# Patient Record
Sex: FEMALE | Race: OTHER | HISPANIC OR LATINO | Employment: FULL TIME | URBAN - METROPOLITAN AREA
[De-identification: names, ages, dates, MRNs, and addresses within clinical notes are randomized per-mention and may not be internally consistent; named-entity substitution may affect disease eponyms.]

---

## 2017-11-16 ENCOUNTER — GENERIC CONVERSION - ENCOUNTER (OUTPATIENT)
Dept: OTHER | Facility: OTHER | Age: 32
End: 2017-11-16

## 2018-01-17 NOTE — RESULT NOTES
Verified Results  (1) TSH WITH FT4 REFLEX 08Oct2016 11:22AM Madeline Owens    Order Number: [id number]     Test Name Result Flag Reference   TSH 0 708 uIU/mL  0 358-3 740   Patients undergoing fluorescein dye angiography may retain small amounts of fluorescein in the body for 48-72 hours post procedure  Samples containing fluorescein can produce falsely depressed TSH values  If the patient had this procedure,a specimen should be resubmitted post fluorescein clearance  The recommended reference ranges for TSH during pregnancy are as follows:  First trimester 0 1 to 2 5 uIU/mL  Second trimester  0 2 to 3 0 uIU/mL  Third trimester 0 3 to 3 0 uIU/m     (1) COMPREHENSIVE METABOLIC PANEL [id number] 06:39XI Radha Leader Order Number: [id number]     Test Name Result Flag Reference   GLUCOSE,RANDM 86 mg/dL     If the patient is fasting, the ADA then defines impaired fasting glucose as > 100 mg/dL and diabetes as > or equal to 123 mg/dL  SODIUM 140 mmol/L  136-145   POTASSIUM 4 0 mmol/L  3 5-5 3   CHLORIDE 108 mmol/L  100-108   CARBON DIOXIDE 26 mmol/L  21-32   ANION GAP (CALC) 6 mmol/L  4-13   BLOOD UREA NITROGEN 12 mg/dL  5-25   CREATININE 0 83 mg/dL  0 60-1 30   Standardized to IDMS reference method   CALCIUM 8 5 mg/dL  8 3-10 1   BILI, TOTAL 0 31 mg/dL  0 20-1 00   ALK PHOSPHATAS 63 U/L     ALT (SGPT) 17 U/L  12-78   AST(SGOT) 11 U/L  5-45   ALBUMIN 4 0 g/dL  3 5-5 0   TOTAL PROTEIN 7 1 g/dL  6 4-8 2   eGFR Non-African American      >60 0 ml/min/1 73sq [facility] Disease Education Program recommendations are as follows:  GFR calculation is accurate only with a steady state creatinine  Chronic Kidney disease less than 60 ml/min/1 73 sq  meters  Kidney failure less than 15 ml/min/1 73 sq  meters       (1) CBC/PLT/DIFF [id number] 11:22AM Radha Leader Order Number: [id number]     Test Name Result Flag Reference   WBC COUNT 6 20 Thousand/uL 4 31-10 16   RBC COUNT 4 29 Million/uL  3 81-5 12   HEMOGLOBIN 12 8 g/dL  11 5-15 4   HEMATOCRIT 39 0 %  34 8-46  1   MCV 91 fL  82-98   MCH 29 8 pg  26 8-34 3   MCHC 32 8 g/dL  31 4-37 4   RDW 13 5 %  11 6-15 1   MPV 9 9 fL  8 9-12 7   PLATELET COUNT 506 Thousands/uL  149-390   nRBC AUTOMATED 0 /100 WBCs     NEUTROPHILS RELATIVE PERCENT 65 %  43-75   LYMPHOCYTES RELATIVE PERCENT 27 %  14-44   MONOCYTES RELATIVE PERCENT 5 %  4-12   EOSINOPHILS RELATIVE PERCENT 2 %  0-6   BASOPHILS RELATIVE PERCENT 1 %  0-1   NEUTROPHILS ABSOLUTE COUNT 4 04 Thousands/?L  1 85-7 62   LYMPHOCYTES ABSOLUTE COUNT 1 68 Thousands/?L  0 60-4 47   MONOCYTES ABSOLUTE COUNT 0 31 Thousand/?L  0 17-1 22   EOSINOPHILS ABSOLUTE COUNT 0 10 Thousand/?L  0 00-0 61   BASOPHILS ABSOLUTE COUNT 0 05 Thousands/?L  0 00-0 10   - Patient Instructions: This bloodwork is non-fasting  Please drink two glasses of water morning of bloodwork  - Patient Instructions: This bloodwork is non-fasting  Please drink two glasses of water morning of bloodwork

## 2018-01-22 VITALS
HEART RATE: 82 BPM | SYSTOLIC BLOOD PRESSURE: 118 MMHG | RESPIRATION RATE: 14 BRPM | DIASTOLIC BLOOD PRESSURE: 74 MMHG | WEIGHT: 157 LBS | TEMPERATURE: 97.8 F | BODY MASS INDEX: 26.8 KG/M2 | HEIGHT: 64 IN

## 2018-09-26 PROBLEM — H11.003 PTERYGIUM OF BOTH EYES: Status: ACTIVE | Noted: 2017-11-16

## 2018-09-26 RX ORDER — PHENOL 1.4 %
1 AEROSOL, SPRAY (ML) MUCOUS MEMBRANE
COMMUNITY
Start: 2016-11-18 | End: 2018-09-27

## 2018-09-26 RX ORDER — ESCITALOPRAM OXALATE 10 MG/1
1 TABLET ORAL DAILY
COMMUNITY
Start: 2016-11-18 | End: 2018-09-27

## 2018-09-26 RX ORDER — PNV NO.95/FERROUS FUM/FOLIC AC 28MG-0.8MG
TABLET ORAL
COMMUNITY
End: 2018-09-27 | Stop reason: SDUPTHER

## 2018-09-27 ENCOUNTER — OFFICE VISIT (OUTPATIENT)
Dept: FAMILY MEDICINE CLINIC | Facility: CLINIC | Age: 33
End: 2018-09-27
Payer: COMMERCIAL

## 2018-09-27 VITALS
RESPIRATION RATE: 14 BRPM | WEIGHT: 176.2 LBS | HEART RATE: 78 BPM | DIASTOLIC BLOOD PRESSURE: 72 MMHG | HEIGHT: 63 IN | TEMPERATURE: 97.6 F | BODY MASS INDEX: 31.22 KG/M2 | SYSTOLIC BLOOD PRESSURE: 116 MMHG

## 2018-09-27 DIAGNOSIS — Z3A.08 8 WEEKS GESTATION OF PREGNANCY: Primary | ICD-10-CM

## 2018-09-27 DIAGNOSIS — Z32.01 POSITIVE PREGNANCY TEST: ICD-10-CM

## 2018-09-27 PROCEDURE — 3008F BODY MASS INDEX DOCD: CPT | Performed by: FAMILY MEDICINE

## 2018-09-27 PROCEDURE — 99213 OFFICE O/P EST LOW 20 MIN: CPT | Performed by: FAMILY MEDICINE

## 2018-09-27 RX ORDER — PNV NO.95/FERROUS FUM/FOLIC AC 28MG-0.8MG
1 TABLET ORAL DAILY
Qty: 90 TABLET | Refills: 3 | Status: SHIPPED | OUTPATIENT
Start: 2018-09-27 | End: 2019-01-23 | Stop reason: SDUPTHER

## 2018-09-27 NOTE — ASSESSMENT & PLAN NOTE
Positive pregnancy test in office and at home  Counseled patient regarding early pregnancy loss  Reviewed precautions for vaginal bleeding and when to call/go to ED   Obtain prenatal labs  Obtain dating U/S  By LMP, this is a [id number] at 8w0d  RTC 2 weeks for initial PN visit

## 2018-09-27 NOTE — PROGRESS NOTES
Anne Barton 1985 female MRN: [id number]    Acute Visit    SUBJECTIVE    CC:  Positive pregnancy test     HPI:  Anne Barton is a 29 y o  female who presented for an acute visit complaining of HPI Pregnancy test positive    Took a home pregnancy test that was positive  They've been trying to conceive for the last 1 year  G1 2005 C/S failed cervical dilation term  G2 2009 C/S term  G3 2016 18 weeks  G4 current pregnancy  May be interested in [facility]  No sugar or BP problems  She does complain of some bleeding 3x  3 days ago x1  Then 1 5 weeks ago x1  Pink discharge, no clots  No cramping  Vaginal discharge normal, no small  No dysuria  Regular BM  Denies nausea, vomiting  Not taking prenatal vitamin  She does not take any daily medications  Last drink on her birthday   Quit smoking when she found out she was pregnant  Review of Systems   Constitutional: Negative for activity change, chills and fever  HENT: Negative for congestion, rhinorrhea and sore throat  Eyes: Negative for visual disturbance  Respiratory: Negative for cough, shortness of breath and wheezing  Cardiovascular: Negative for chest pain and palpitations  Gastrointestinal: Negative for abdominal pain, blood in stool, constipation, diarrhea, nausea and vomiting  Genitourinary: Positive for vaginal bleeding  Negative for dysuria, flank pain, frequency, menstrual problem, pelvic pain, urgency, vaginal discharge and vaginal pain  Musculoskeletal: Negative for arthralgias and myalgias  Skin: Negative for rash  Neurological: Negative for dizziness, weakness and headaches  All other systems reviewed and are negative  Historical Information   The patient history was reviewed as follows:  History reviewed  No pertinent past medical history    Past Surgical History:   Procedure Laterality Date     SECTION       Family History   Problem Relation Age of Onset    Diabetes Father     Diabetes Brother       Social History   History   Alcohol Use    Yes     Comment: Social     History   Drug Use No     History   Smoking Status    Current Every Day Smoker   Smokeless Tobacco    Never Used       Medications:   Meds/Allergies   Current Outpatient Prescriptions   Medication Sig Dispense Refill    Prenatal Vit-Fe Fumarate-FA (PRENATAL VITAMIN) 27-0 8 MG TABS Take 1 tablet by mouth daily 90 tablet 3    prenatal multivitamin (ULTRATABS) TABS Take 1 tablet by mouth daily 30 tablet 0     No current facility-administered medications for this visit  Allergies not on file    OBJECTIVE    Vitals:   Vitals:    09/27/18 1606   BP: 116/72   BP Location: Right arm   Patient Position: Sitting   Cuff Size: Large   Pulse: 78   Resp: 14   Temp: 97 6 °F (36 4 °C)   TempSrc: Tympanic   Weight: 79 9 kg (176 lb 3 2 oz)   Height: 5' 3 2" (1 605 m)       Physical Exam   Constitutional: Vital signs are normal  She appears well-developed and well-nourished  She does not appear ill  No distress  HENT:   Head: Normocephalic and atraumatic  Right Ear: External ear normal    Left Ear: External ear normal    Nose: Nose normal    Eyes: Conjunctivae, EOM and lids are normal    Neck: No JVD present  No tracheal deviation present  Cardiovascular: Intact distal pulses  Pulmonary/Chest: No accessory muscle usage  No respiratory distress  Abdominal: Normal appearance  Neurological: She is alert  Skin: No rash noted  She is not diaphoretic  Psychiatric: She has a normal mood and affect  Her speech is normal and behavior is normal  She expresses no suicidal ideation  Nursing note and vitals reviewed  Lab:  I have personally reviewed all pertinent results  Imaging:  I have personally reviewed all pertinent results  EKG, Pathology, and Other Studies:   I have personally reviewed all pertinent results      Assessment/Plan   8 weeks gestation of pregnancy  Positive pregnancy test in office and at home  Counseled patient regarding early pregnancy loss  Reviewed precautions for vaginal bleeding and when to call/go to ED   Obtain prenatal labs  Obtain dating U/S  By LMP, this is a [id number] at 8w0d  RTC 2 weeks for initial PN visit    Diagnoses and all orders for this visit:    8 weeks gestation of pregnancy  -     prenatal multivitamin (ULTRATABS) TABS; Take 1 tablet by mouth daily  -     Prenatal Panel; Future  -     US OB < 14 weeks with transvaginal; Future  -     Prenatal Vit-Fe Fumarate-FA (PRENATAL VITAMIN) 27-0 8 MG TABS; Take 1 tablet by mouth daily    Positive pregnancy test            - PCP: Eloisa Caro MD  - Follow-up appointments: [facility]    Future Appointments  Date Time Provider Tram Galvezi   10/9/2018 8:45 AM [facility]   10/10/2018 4:20 PM Marisela Ching MD [facility]      _____________________________________________________________________   The attending physician, Dr Pamela Pinedo, agreed with the plan  Jeremy Islas MD, PGY-3  [facility]   9/27/2018        Please be aware that this note contains text that was dictated and there may be errors pertaining to "sound-alike "words during the dictation process

## 2018-09-27 NOTE — PATIENT INSTRUCTIONS
Un embarazo,   CUIDADO AMBULATORIO:   Lo qué necesita saber sobre el Giovanni: Un embarazo normal dura alrededor de 40 semanas  El primer trimestre dura desde estrada último periodo hasta la semana 12 de [place]  El yaquelin trimestre se extiende desde la semana 13 de estrada embarazo hasta la semana 23  El tercer trimestre se extiende desde la semana 24 de embarazo hasta que nazca estrada bebé  Si usted conoce la fecha de estrada último periodo, estrada médico puede calcular la fecha de nacimiento de estrada bebé  Es posible que usted de a vangie a estrada bebé en cualquier momento desde la semana 37 hasta 2 semanas después de la fecha calculada de Meri  Busque atención médica de inmediato si:   · Usted presenta un vickie dolor de tiny que no desaparece  · Usted tiene cambios en la visión nuevos o en aumento, lavinia visión borrosa o con manchas  · Usted tiene inflamación nueva o creciente en estrada mack o syd  · Usted tiene dolor o cólicos en el abdomen o la parte baja de la espalda  · Usted tiene sangrado vaginal   Comuníquese con estrada médico o obstreta si:   · Usted tiene calambres, presión o tensión abdominal     · Usted tiene un cambio en la secreción vaginal     · Usted no puede retener alimentos ni líquidos y está perdiendo [place]  · Usted tiene escalofríos o fiebre  · Usted tiene comezón, ardor o dolor vaginal      · Usted tiene kurt secreción vaginal amarillenta, verdosa, ronn o de [employer]  · Usted tiene dolor o ardor al [facility], orina menos de lo habitual o tiene [place] rosada o sanguinolenta  · Usted tiene preguntas o inquietudes acerca de estrada condición o cuidado  Cambios corporales que pueden ocurrir gustavo estrada embarazo:   · Los cambios en los senos  que usted Kimberly Lithonia sensibilidad y cosquilleo gustavo la primera parte de estrada [place]  Los senos se volverán más grandes  Es posible que necesite un sostén con soporte   Es posible que usted calvin [facility] secreción delgada y LEEANNE, conocida lavinia calostro, que sale de justus pezones gustavo el yaquelin trimestre  El calostro es un líquido que se convertirá en [place] alrededor de 3 días después de usted stanislaw dado a vangie  · Cambios en la piel y estrías  podrían ocurrir gustavo estrada embarazo  Es posible que usted tenga marcas ireland, conocidas lavinia estrías, en estrada piel  Las [employer] se desvanecen después del [facility]  Utilice crema si estrada piel está seca y con comezón  La piel de estrada mack, alrededor de los pezones y debajo de estrada ombligo podría oscurecerse  La mayoría del [place], estrada piel Lee Mallet a estrada color normal después del nacimiento de estrada bebé  · El malestar matutino  consiste en náuseas y vómitos que pueden ocurrir en cualquier momento del día  Evite los alimentos grasosos y picantes  Coma comidas pequeñas gustavo el día en vez de porciones grandes  El jengibre puede ayudar a [employer]  Consulte con estrada médico acerca de otras formas para disminuir las náuseas y el vómito  · Acidez estomacal  puede ser causada por los cambios hormonales gustavo estrada [facility]  El Claudetta Sane en crecimiento puede empujar estrada estómago hacia arriba y forzar ácido estomacal a acumularse dentro de estrada esófago  [place] 4 o 5 comidas pequeñas cada día en vez de comidas grandes  Evite los alimentos picantes  Evite comer lily antes de irse a la cama  · Estreñimiento  puede desarrollarse gustavo estrada embarazo  Para tratar el estreñimiento, coma alimentos altos en fibra lavinia cereales con fibra, frijoles, frutas, verduras, panes integrales y [place]  Gearlean Quiet de Jenna regular y tome suficiente agua  Es posible que estrada médico sugiera un suplemento con fibra para ablandar justus evacuaciones intestinales  Consulte con estrada médico antes de usar cualquier medicamento para disminuir el estreñimiento  · Las hemorroides  son Elan Hun grandes en el área rectal  Pueden causar dolor, comezón y sangrado de color kong vivo en estrada recto   Para disminuir el riesgo de hemorroides, prevenga el estreñimiento y no se esfuerce cuando tenga kurt evacuación intestinal  Si usted tiene hemorroides, sumérjase en kurt bañera con agua tibia para aliviar la incomodidad  Consulte con carl médico cómo puede tratar las hemorroides  · Los calambres y la hinchazón en las piernas  pueden ser causados por niveles bajos de calcio o por el peso adicional del Giovanni  Eleve justus piernas por encima del nivel de carl corazón para disminuir la hinchazón  Gustavo un calambre en la pierna, estire o de un masaje al [employer] que tiene el calambre  El calor puede ayudar a disminuir el dolor y los espasmos musculares  Aplique calor sobre el músculo por 20 a 30 minutos cada 2 horas por la cantidad de días que se le indique  · Dolor en la espalda  puede ocurrir a medida que carl bebé crece  No esté de pie por largos periodos de tiempo ni levante objetos pesados  Use kurt buena postura mientras esté de pie, se agache o se doble  Use zapatos de tacón bajo con un buen soporte  Descansar puede también ayudarla a aliviar el dolor de espalda  Pregunte a carl médico acerca de ejercicios que usted pueda hacer para fortalecer los músculos de carl espalda  Manténgase saludable gustavo carl embarazo:   · Consuma alimentos saludables y variados  Alimentos saludables incluyen frutas, verduras, panes de monet integral, alimentos lácteos bajos en grasa, frijoles, manohar magras y pescado  [place] líquidos lavinia se le haya indicado  Pregunte cuánto líquido debe genna cada día y cuáles líquidos son los más adecuados para usted  Limite el consumo de cafeína a menos de [place] [address]  Limite el consumo de pescado a 2 porciones cada semana  Escoja pescado con concentraciones bajas de marlen lavinia atún ligero enlatado, camarón, cangrejo, salmón, bacalao o tilapia  No  coma pescado con concentraciones altas de marlen lavinia pez ap, paolo abraham, pargo rayado y tiburón  · [address] [address]    Carl necesidad de ciertas vitaminas y [address], lavinia el ácido fólico, aumenta gustavo el [facility]  Las vitaminas prenatales proporcionan algunas de las vitaminas y minerales adicionales que usted necesita  Las vitaminas prenatales también podrían ayudar a disminuir el riesgo de ciertos defectos de nacimiento  · Pregunte cuánto peso usted debe aumentar gustavo estrada embarazo  Demasiado aumento de peso o muy poco puede ser poco saludable para usted y estrada bebé  · Consulte con estrada médico acerca de hacer ejercicio  El ejercicio moderado puede ayudarla a mantenerse en forma  Estrada médico la ayudará a planear un programa de ejercicios que sea seguro para usted gustavo estrada [facility]  · No fume  Si usted fuma, nunca es demasiado tarde para dejar de hacerlo  Fumar aumenta el riesgo de aborto espontáneo y otros problemas de dianelys gustavo estrada [facility]  Fumar puede causar que estrada bebé nazca antes de tiempo o que pese menos al nacer  Solicite información a estrada médico si usted necesita ayuda para dejar de fumar  · No consuma alcohol  El alcohol pasa de estrada cuerpo al bebé a través de la placenta  Puede afectar el desarrollo del cerebro de estrada bebé y provocar el síndrome de alcoholismo fetal (SAF)  FAS es un phi de condiciones que causan [address], de comportamiento y de crecimiento  · Consulte con estrada médico antes de genna cualquier medicamento  Muchos medicamentos pueden perjudicar a estrada bebé si usted los siabel [address] [place] [address] Avenue  No tome ningún medicamento, vitaminas, hierbas o suplementos sin sana consultar con estrada [facility] Shank  use drogas ilegales o de la michel (lavinia marihuana o cocaína) mientras está embarazada  Consejos de seguridad:   · Evite jacuzzis y saunas  No use un jacuzzi o un sauna mientras usted está embarazada, especialmente gustavo el primer trimestre  Los [facility] y los saunas aumentan la temperatura de estrada bebé y el riesgo de defectos de nacimiento  · Evite la toxoplasmosis    Wells es [facility] infección causada por comer carne cruda o estar cerca del excremento de un liss infectado  [place] puede causar malformaciones congénitas, aborto espontáneo y Huseyin Schein  Lávese las syd después de tocar carne cruda  Asegúrese de que la carne esté alek cocida antes de comerla  Evite los huevos crudos y la Gearldean Maeve  Use guantes o pida que alguien la ayude a limpiar la caja de arena del liss mientras usted Laqueta Snipe  · Consulte con carl médico acerca de viajar  El [address] cómodo para viajar es gustavo el yaquelin trimestre  Pregunte a carl médico si usted puede viajar después de las 36 semanas  Es posible que no pueda viajar en avión después de las 36 [address]  También le puede recomendar que evite largos viajes por carretera  Programe un control con carl médico u obstetra según lo indicado:  Vaya a todas justus citas prenatales gustavo carl embarazo  Anote justus preguntas para que se acuerde de hacerlas gustavo justus visitas  © 2017 [address] Information is for End User's use only and may not be sold, redistributed or otherwise used for commercial purposes  All illustrations and images included in CareNotes® are the copyrighted property of A D A M , Inc  or Fritz Rogel  Esta información es sólo para uso en educación  Carl intención no es darle un consejo médico sobre enfermedades o tratamientos  Colsulte con carl Melven Rimes farmacéutico antes de seguir cualquier régimen médico para saber si es seguro y efectivo para usted

## 2018-10-07 ENCOUNTER — LAB (OUTPATIENT)
Dept: LAB | Facility: HOSPITAL | Age: 33
End: 2018-10-07
Payer: COMMERCIAL

## 2018-10-07 ENCOUNTER — HOSPITAL ENCOUNTER (OUTPATIENT)
Dept: RADIOLOGY | Facility: HOSPITAL | Age: 33
Discharge: HOME | End: 2018-10-07
Payer: COMMERCIAL

## 2018-10-07 DIAGNOSIS — Z3A.08 8 WEEKS GESTATION OF PREGNANCY: ICD-10-CM

## 2018-10-07 LAB
ABO GROUP BLD: NORMAL
BASOPHILS # BLD AUTO: 0.04 THOUSANDS/ΜL (ref 0–0.1)
BASOPHILS NFR BLD AUTO: 0 % (ref 0–1)
BILIRUB UR QL STRIP: NEGATIVE
BLD GP AB SCN SERPL QL: NEGATIVE
CLARITY UR: CLEAR
COLOR UR: NORMAL
EOSINOPHIL # BLD AUTO: 0.05 THOUSAND/ΜL (ref 0–0.61)
EOSINOPHIL NFR BLD AUTO: 1 % (ref 0–6)
ERYTHROCYTE [DISTWIDTH] IN BLOOD BY AUTOMATED COUNT: 12.1 % (ref 11.6–15.1)
GLUCOSE UR STRIP-MCNC: NEGATIVE MG/DL
HBV SURFACE AG SER QL: NORMAL
HCT VFR BLD AUTO: 37.8 % (ref 34.8–46.1)
HGB BLD-MCNC: 12.5 G/DL (ref 11.5–15.4)
HGB UR QL STRIP.AUTO: NEGATIVE
IMM GRANULOCYTES # BLD AUTO: 0.03 THOUSAND/UL (ref 0–0.2)
IMM GRANULOCYTES NFR BLD AUTO: 0 % (ref 0–2)
KETONES UR STRIP-MCNC: NEGATIVE MG/DL
LEUKOCYTE ESTERASE UR QL STRIP: NEGATIVE
LYMPHOCYTES # BLD AUTO: 1.71 THOUSANDS/ΜL (ref 0.6–4.47)
LYMPHOCYTES NFR BLD AUTO: 18 % (ref 14–44)
MCH RBC QN AUTO: 30.7 PG (ref 26.8–34.3)
MCHC RBC AUTO-ENTMCNC: 33.1 G/DL (ref 31.4–37.4)
MCV RBC AUTO: 93 FL (ref 82–98)
MONOCYTES # BLD AUTO: 0.44 THOUSAND/ΜL (ref 0.17–1.22)
MONOCYTES NFR BLD AUTO: 5 % (ref 4–12)
NEUTROPHILS # BLD AUTO: 7.4 THOUSANDS/ΜL (ref 1.85–7.62)
NEUTS SEG NFR BLD AUTO: 76 % (ref 43–75)
NITRITE UR QL STRIP: NEGATIVE
NRBC BLD AUTO-RTO: 0 /100 WBCS
PH UR STRIP.AUTO: 5.5 [PH] (ref 4.5–8)
PLATELET # BLD AUTO: 264 THOUSANDS/UL (ref 149–390)
PMV BLD AUTO: 10.4 FL (ref 8.9–12.7)
PROT UR STRIP-MCNC: NEGATIVE MG/DL
RBC # BLD AUTO: 4.07 MILLION/UL (ref 3.81–5.12)
RH BLD: POSITIVE
RUBV IGG SERPL IA-ACNC: 69.4 IU/ML
SP GR UR STRIP.AUTO: 1.02 (ref 1–1.03)
SPECIMEN EXPIRATION DATE: NORMAL
UROBILINOGEN UR QL STRIP.AUTO: 0.2 E.U./DL
WBC # BLD AUTO: 9.67 THOUSAND/UL (ref 4.31–10.16)

## 2018-10-07 PROCEDURE — 36415 COLL VENOUS BLD VENIPUNCTURE: CPT

## 2018-10-07 PROCEDURE — 80081 OBSTETRIC PANEL INC HIV TSTG: CPT

## 2018-10-07 PROCEDURE — 87086 URINE CULTURE/COLONY COUNT: CPT

## 2018-10-07 PROCEDURE — 76801 OB US < 14 WKS SINGLE FETUS: CPT

## 2018-10-07 PROCEDURE — 81003 URINALYSIS AUTO W/O SCOPE: CPT

## 2018-10-08 LAB
BACTERIA UR CULT: NORMAL
HIV 1+2 AB+HIV1 P24 AG SERPL QL IA: NORMAL
RPR SER QL: NORMAL

## 2018-10-10 ENCOUNTER — ROUTINE PRENATAL (OUTPATIENT)
Dept: FAMILY MEDICINE CLINIC | Facility: CLINIC | Age: 33
End: 2018-10-10
Payer: COMMERCIAL

## 2018-10-10 VITALS — WEIGHT: 179.6 LBS | BODY MASS INDEX: 31.61 KG/M2 | SYSTOLIC BLOOD PRESSURE: 122 MMHG | DIASTOLIC BLOOD PRESSURE: 80 MMHG

## 2018-10-10 DIAGNOSIS — R30.0 DYSURIA: Primary | ICD-10-CM

## 2018-10-10 LAB
SL AMB  POCT GLUCOSE, UA: NEGATIVE
SL AMB LEUKOCYTE ESTERASE,UA: NEGATIVE
SL AMB POCT BILIRUBIN,UA: NEGATIVE
SL AMB POCT BLOOD,UA: NEGATIVE
SL AMB POCT CLARITY,UA: CLEAR
SL AMB POCT COLOR,UA: YELLOW
SL AMB POCT KETONES,UA: NEGATIVE
SL AMB POCT NITRITE,UA: NEGATIVE
SL AMB POCT PH,UA: 5
SL AMB POCT SPECIFIC GRAVITY,UA: 1
SL AMB POCT URINE PROTEIN: NEGATIVE
SL AMB POCT UROBILINOGEN: 0.2

## 2018-10-10 PROCEDURE — 81003 URINALYSIS AUTO W/O SCOPE: CPT | Performed by: FAMILY MEDICINE

## 2018-10-10 PROCEDURE — 0501F PRENATAL FLOW SHEET: CPT | Performed by: FAMILY MEDICINE

## 2018-10-10 NOTE — PROGRESS NOTES
OB/GYN  PN Visit  Gisele Ledesma  [id number]  10/10/2018  8:43 PM  Dr Lita Cortes MD    S: 29 y o  [id number]  here for initial PN visit  HPI: 29 y o  [id number]  here for  Initial PN visit  G1: 2/2005: term C/S 2/2 to failed cervical dilation   G2: 7/2009: term C/S says she was told by doctor she had to have a c section   G3: 1/2016: loss @ 18wks  G4: Current pregnancy, pt may be interested in [facility], says she's scared of labor and delivering vaginally  DARLENE: 05/09/19  LMP: 08/02/18    OB Cx: none, previously smoker quit about 1 5 months ago (after she found out she was pregnant)  OB complaints: none  Ctxns: denies having uterine contractions and denies fever  Leakage:  no LOF  Bleeding: no VB  Fetal movement: too early to detect    She reports increased frequency, denies urgency or dysuria  O:  Vitals:    10/10/18 1634   BP: 122/80       Gen:  No acute distress, nonlabored breathing  HENT: EOM nl  Cardiovascular: Regular, Rate and Rhythm, no murmur, gallop or rub   Respiratory: Clear to Auscultation Bilaterally, no wheezing, rhonchi or rales    Abdominal: Soft  NT/ND  Gravid  Neuro: AAOX3  Psychiatric: Normal mood and affect  Speech and behavior normal   OB exam completed: fundal height, FHTs, ultrasound if indicated    A/P:  #1  [id number] w/o complaints, doing well  Prenatal Panel Reviewed:    - HIV: non-reactive   - Hep B surface antigen: wnl   - Rubella: Immune   - RPR: non-reactive   - Type & Screen: O+   - Antibody Screen: (-)   - CBC: wnl    US 10/07/18:    - single intrauterine gestation @ 9w1d (+/-4)        RTC in 1 week for Pap, 1 month for PN F/U    #2  Last Pap 2015: pt is due for Pap, however would like to schedule an appointment at a later time      #3  Genetic Screening Questionaire non-contributory (maternal & paternal)    Unable to detect FHT most probably due to early dates, will continue to monitor    Counseled pt regarding early pregnancy loss  Early Labor precautions reviewed  Discussed the importance of a well balanced diet  Discussed the importance of adequate hydration  Discussed the importance of taking PN vitamin daily      Future Appointments  Date Time Provider Tram Katherine   10/17/2018 3:00 PM Adriana Al MD [facility]   11/13/2018 3:40 PM MD Sheri Coleman MD  10/10/2018  8:43 PM

## 2018-10-17 ENCOUNTER — ANNUAL EXAM (OUTPATIENT)
Dept: FAMILY MEDICINE CLINIC | Facility: CLINIC | Age: 33
End: 2018-10-17
Payer: COMMERCIAL

## 2018-10-17 VITALS
TEMPERATURE: 97.3 F | BODY MASS INDEX: 31.89 KG/M2 | HEART RATE: 72 BPM | RESPIRATION RATE: 16 BRPM | HEIGHT: 63 IN | SYSTOLIC BLOOD PRESSURE: 130 MMHG | WEIGHT: 180 LBS | DIASTOLIC BLOOD PRESSURE: 74 MMHG

## 2018-10-17 DIAGNOSIS — Z01.419 WOMEN'S ANNUAL ROUTINE GYNECOLOGICAL EXAMINATION: Primary | ICD-10-CM

## 2018-10-17 DIAGNOSIS — Z3A.12 12 WEEKS GESTATION OF PREGNANCY: ICD-10-CM

## 2018-10-17 PROCEDURE — G0145 SCR C/V CYTO,THINLAYER,RESCR: HCPCS | Performed by: FAMILY MEDICINE

## 2018-10-17 PROCEDURE — 99395 PREV VISIT EST AGE 18-39: CPT | Performed by: FAMILY MEDICINE

## 2018-10-17 PROCEDURE — 87591 N.GONORRHOEAE DNA AMP PROB: CPT | Performed by: FAMILY MEDICINE

## 2018-10-17 PROCEDURE — 87491 CHLMYD TRACH DNA AMP PROBE: CPT | Performed by: FAMILY MEDICINE

## 2018-10-17 PROCEDURE — 87624 HPV HI-RISK TYP POOLED RSLT: CPT | Performed by: FAMILY MEDICINE

## 2018-10-17 NOTE — PROGRESS NOTES
[address]  Annual GYN Examination  Ashley Preston  1985    Subjective      Ana Lilia Marshall is a 29 y o  female who presents for annual well woman exam      GYN:  · Denies vaginal discharge, labial erythema or lesions, dyspareunia  · Menarche at 16  · Menses are regular, q 28 -30 days, lasting 6 days  · periods are regular  · no unusual pelvic pain  · no unusual vaginal discharge  · no previous abnormal Pap tests  · no family or personal history of cervical cancer  · Contraception: none, pt currently pregnant  · Patient is sexually active with one male partner  · Gynecologic surgeries: Denies (OB: 2 C sections)    OB:  · [id number]  Living children: 2  · Spontaneous AB: 1 female  · Pregnancies were uncomplicated  1 pregnancy loss  :  · Denies dysuria, urinary frequency or urgency  · Denies hematuria, flank pain, incontinence  Breast:  · Denies breast mass, skin changes, dimpling, reddening, nipple retraction  · Denies breast discharge  · Patient does do monthly breast exams  · Patient does have a family history of breast, endometrial, or ovarian cancer   ·     General:  · Diet: poor diet  · Exercise: Rarely participates in formal exercise  · Work: currently employed  · ETOH: denies use  · Tobacco: Former user  · Recreational drug use: denies use    Screening:  · Cervical cancer: Last pap done Dec 14, 2015, no abnormalities, high-risk HPV not done  · Breast cancer: Last mammogram not done  ·  no family history of breast cancer  · Colon cancer: none   ·  no family history of colon cancer  · STD screening:  ·  no past medical history of STDs  ·  screen for gonorrhea and chlamydia today ( patient is currently pregnant )      Review of Systems  Pertinent items are noted in HPI        Objective      LMP 08/02/2018     General:   alert and oriented, in no acute distress, appears stated age and cooperative   Heart: regular rate and rhythm, S1, S2 normal, no murmur, click, rub or gallop   Lungs: clear to auscultation bilaterally   Abdomen: soft, nondistended and normal bowel sounds   Vulva: normal   Vagina: normal mucosa   Cervix: no lesions               Assessment/Plan:     -      Pap smear with HPV co-testing performed today   -      Will await results and notify patient when results received  -      STI Screening: requested by patient   -      GC/chlamydia swab collected today? Yes    -      Additional STI tests ordered (lab slips given):   -      Reviewed safe-sex practices & contraception options in detail    -   Patient wishes to have tubal ligation after pregnancy    -  Advised patient to take time to think about the procedure, discussed risks and benefits,  Discussed the difficulty in reversing the procedure and associated financial and mental cost    -    Will discuss with patient throughout future visits, would like patient  To give herself time   -      Mammogram ordered today: Not Indicated  -      Colonoscopy ordered today: Not Indicated  -      Health maintenance counseling performed on the following topics:   -      Regular exercise (at least 150 minutes of moderate aerobic activity per week or 75 minutes of vigorous aerobic activity per week) for CV & bone health    -     unhealthy diet     - discussed healthy diet to maintain a healthy weight   -      Adequate intake of calcium & vitamin D to reduce osteoporosis risk  -      All questions have been answered to her satisfaction      VANIA Mcgee  PGY-1  [place] [id number]

## 2018-10-17 NOTE — PATIENT INSTRUCTIONS
Nutrition: How to Make Ml Rasmussen6  A healthy diet has a lot of benefits  It can prevent certain health conditions like heart disease and cancer, and it can lower your cholesterol  It can give you more energy, help you focus, and improve your mood  It can also help you lose weight or stay at a healthy weight  Path to Improved Health  The choices you make about what you eat and drink matter  They should add up to a balanced, nutritious diet  We all have different calorie needs based on our age, sex, and activity level  Health conditions can have a role, too  Fruits and Vegetables  Fruits and vegetables are rich in fiber, vitamins, and minerals  They should be the basis of your diet  Try to get many different colors of fruits and vegetables each day to add flavor and variety  Fruits and vegetables should cover half of your plate at each meal  Try not to add saturated fats and sugar to vegetables and fruits  This means avoiding margarine, butter, mayonnaise, and sour cream  You can use yogurt, healthy oils (such as canola or olive oil), or herbs instead  Potatoes and corn are not considered vegetables  Your body processes them more like grains  FRUITS & VEGETABLES   INSTEAD OF THIS: TRY THIS:   Regular or fried vegetables served with cream, cheese, or butter Raw, steamed, boiled, sautéed, or baked vegetables tossed with olive oil, salt, and pepper, or with onions or spices added (like garlic and cumin)   Fruits served with cream cheese or sugary sauces Fresh fruit with peanut, almond, or cashew butter or plain yogurt   Fried potatoes, including french fries, hash browns, and potato chips Baked sweet potatoes or other vegetables     Grains  Choose products that list whole grains as the first ingredient  Whole grains are high in fiber, protein, and vitamins  They are digested slowly, which helps you feel full longer and keeps you from overeating  Avoid products that say enriched    Hot cereals like oatmeal are usually low in saturated fat  However, instant cereals with cream may contain processed oils and can be high in sugar  Granola cereals usually contain a lot of sugar  Cold cereals are generally made with refined grains and are high in sugars  Look for whole-grain, low-sugar options instead  Try not to eat rich sweets, such as doughnuts, rolls, and muffins  Consider fruit or a piece of dark chocolate instead to satisfy your sweet tooth  GRAINS   INSTEAD OF THIS: TRY THIS:   Croissants, rolls, biscuits, and white breads Whole-grain breads, including wheat, rye, and pumpernickel   Doughnuts, pastries, and scones Whole-grain English muffins and small whole-grain bagels   Fried tortillas Soft tortillas (corn or whole wheat) without trans fats   Sugary cereals and regular granola Whole-grain cereal, oatmeal, and reduced-sugar granola   Snack crackers Whole-grain crackers   Potato or corn chips and buttered popcorn Unbuttered popcorn   White pasta Whole-wheat pasta   White rice Brown or wild rice   Fried rice or pasta mixes Brown rice or whole-grain pasta with low-sodium vegetable sauce   All-purpose white flour Whole-wheat flour     Protein  Protein can come from animal and vegetable sources  People who get more of their protein from animal sources tend to have more health problems that can lead to illness and early death  It is healthier to eat meat less often and get most of your protein from plant sources  When you eat meat, choose leaner cuts  Vegetable Protein Sources  There are many ways to get protein in your diet even if you do not eat meat  Most vegetables have some protein  When you eat these vegetables with whole grains, seeds, nuts, and especially beans, you can get a good amount of protein  You can swap beans for meat in recipes like lasagna or chili  Soy foods such as tofu, tempeh, and edamame are also good sources of protein      Beef, Pork, Veal, and [employer] and veal cuts have the words loin or round in their names  Lean pork cuts have the words loin or leg in their names  Trim off the outside fat before cooking the meat  Trim any inside fat before eating it  Use herbs, spices, and low-sodium marinades to season meat  Baking, broiling, grilling, and roasting are the healthiest ways to cook meats  Lean cuts can be panbroiled or stir-fried  Use a nonstick pan, canola oil, or olive oil instead of butter or margarine  Don't serve meat with high-fat sauces and gravies  Poultry  Chicken breasts are a good choice because they are low in fat and high in protein  Only eat duck and goose once in a while, because they are higher in saturated fat  Remove skin and visible fat before cooking  Baking, broiling, grilling, and roasting are the healthiest ways to [employer]  Skinless poultry can be pan broiled or stir fried  Use a nonstick pan, canola oil, or olive oil instead of butter or margarine  Seafood  Most seafood is high in healthy polyunsaturated fats  Healthy omega-3 fatty acids also are found in some fish, such as salmon and cold-water trout  If good-quality fresh fish isn't available, buy frozen fish  To prepare fish, you should poach, steam, bake, broil, or grill it      PROTEIN   INSTEAD OF THIS: TRY THIS:   Prime and marbled cuts of meat Select-grade lean beef, such as round, sirloin, and loin cuts   Pork spare ribs and arevalo Lean pork, such as tenderloin and loin chop, turkey arevalo, tofu arevalo   Regular ground beef Lean or extra-lean ground beef, ground chicken or turkey, tempeh, or beans   Lunch meats, such as pepperoni, salami, bologna, and liverwurst Lean lunch meats, such as turkey, chicken, and ham   Regular hot dogs and sausage Fat-free hot dogs, turkey dogs, tofu hot dogs   Breaded fish sticks and cakes, fish canned in oil, or seafood prepared with butter or served with high-fat sauce Fish (fresh, frozen, or canned in water), grilled fish sticks and cakes, or shellfish     Dairy  Choose low-fat, skim, or nondairy milk, such as soy, rice, or almond milk  Try low-fat or part-skim cheeses and other dairy products, or choose smaller portions of foods high in saturated fat  Yogurt can replace sour cream in many recipes  It is important to pick yogurt without added sugar  Try mixing yogurt with fruit for dessert  Sorbet and frozen yogurt are lower in fat than ice cream     DAIRY   INSTEAD OF THIS: TRY THIS:   Whole milk Skim (nonfat), 1% or 2% (low fat), or nondairy milk, such as soy, rice, almond, or cashew milk   Cream or evaporated milk Evaporated skim milk   Regular buttermilk Low-fat buttermilk   Yogurt made with whole milk Low-fat or nonfat yogurt   Regular cheese, including American, blue, Brie, cheddar, Eriberto, and Parmesan Low-fat cheese with less than 3 g fat per serving, or nondairy soy cheese   Regular cottage cheese Low-fat cottage cheese (less than 2% fat)   Regular cream cheese Low-fat cream cheese with less than 3 g fat per 1 oz, or skim ricotta   Ice cream Sorbet, sherbet, or frozen yogurt with less than 3 g fat per ½-cup serving     Fats and Oils  Although high-fat foods are higher in calories, they can help you feel satisfied with eating less  Don't be afraid to have fats in your diet, but try to limit saturated and trans fats  You need saturated and unsaturated fats in your diet, but most Americans get too much saturated fat  Heart disease, diabetes, some cancers, and arthritis have been linked to diets high in saturated fat, particularly saturated fats from animal products  FATS & OILS   INSTEAD OF THIS: TRY THIS:   Cookies Fruit or whole-grain cookies   Shortening, butter, and margarine Olive, canola, and soybean oils   Regular mayonnaise Yogurt   Regular salad dressing Vinaigrette with olive oil and vinegar   Butter or fat to grease pans Nonstick cooking spray, olive oil, or canola oil           Beverages  It is important that you stay hydrated  However, drinks that contain sugar are not healthy  This includes fruit juices, soda, sports and energy drinks, sweetened or flavored milk, and sweet tea  Artificial sweeteners may also be bad for your health  Drink mostly water or other unsweetened drinks  Don't drink too much alcohol  Women should have no more than one drink per day  Men should have no more than two drinks per day  Exercises    Set an Exercise Goal & Make a Plan  If youre ready to start getting active, its time to set a goal and make a plan  Staying Motivated  Its easy to start an exercise routine once youve decided its time for a change, but keeping it up is a challenge for many people  Positive Self-Talk Makes a Difference  The conversations you have with yourself about physical activity and your fitness abilities can have an impact on your performance  Overcoming Barriers to Activity Think about what is keeping you from being active and then check out some of our solutions to the most common barriers to physical activity  · Most adults with with type 1 and type 2 diabetes should engage in 150 min or more of moderate-to-vigorous intensity physical activity per week, spread over at least 3 days/week, with no more than 2 consecutive days without activity  [place] durations (minimum 75 min/week) of vigorous-intensity or interval training may be sufficient for younger and more physically fit individuals  · Adults with type 1  and type 2  diabetes should engage in 23 sessions/week of resistance exercise on nonconsecutive days  · All adults, and particularly those with type 2 diabetes, should decrease the amount of time spent in daily sedentary behavior  Prolonged sitting should be interrupted every 30 min for blood glucose benefits, particularly in adults with type 2 diabetes  · Flexibility training and balance training are recommended 23 times/week for older adults with diabetes   Yoga and benita chi may be included based on individual preferences to increase flexibility, muscular strength, and balance  Patient Education     [employer] Healthy Eating Plate [facility]  Department of Agriculture, Choose My Plate [employer] com br  Http://care  diabetesjournals  org/content/40/Supplement_1/S33    [employer] information company  org: Exercise & Fitness   http://familyGenecurector  org/familydoctor/en/prevention-wellness/exercise-fitness html     American Diabetes Association (ADA): Weight Loss   https://www [employer]/  org/food-and-fitness/fitness/weight-loss/? utm_source=WWW&utm_medium=DropDownFF&utm_content=WeightLoss&utm_camp aign=CON     My Fitness Pal   http://iMedX   Online nutrition and calorie tracker  National Diabetes Education Program (NDEP): Tasty Recipes for People with Diabetes and Their [place] Plate (English and Lithuanian)   https://www leobardo Isomark/     Alessandra Sparks Recipes is a bilingual booklet filled with recipes specifically designed for Latin Americans  Recipes are accompanied by their nutritional facts table  The booklet also includes diabetes health information and resources  [employer]  org: Body Mass Index Calculator   http://familyGenecurector  org/familydoctor/en/health-tools/bmi-calculator html     [employer] information company  org: Food and Nutrition Topics   http://familyGenecurector  org/familydoctor/en/prevention-wellness/food-nutrition  html     ADA: Preventing Diabetes with Good Nutrition   https://www [employer]/  org/advocate/our-priorities/prevention/preventing-diabetes-nutrition  html     Health Power for Minorities: 10 Tips about Diabetes Prevention and Control   Gnammo/HealthChannelDetails  aspx?mw=224   follow-up as previously scheduled

## 2018-10-18 LAB
CHLAMYDIA DNA CVX QL NAA+PROBE: NORMAL
N GONORRHOEA DNA GENITAL QL NAA+PROBE: NORMAL

## 2018-10-22 LAB
HPV HR 12 DNA CVX QL NAA+PROBE: NEGATIVE
HPV16 DNA CVX QL NAA+PROBE: NEGATIVE
HPV18 DNA CVX QL NAA+PROBE: NEGATIVE

## 2018-10-23 LAB
LAB AP GYN PRIMARY INTERPRETATION: NORMAL
LAB AP LMP: NORMAL
Lab: NORMAL

## 2018-11-13 ENCOUNTER — ROUTINE PRENATAL (OUTPATIENT)
Dept: FAMILY MEDICINE CLINIC | Facility: CLINIC | Age: 33
End: 2018-11-13
Payer: COMMERCIAL

## 2018-11-13 VITALS — WEIGHT: 182.2 LBS | DIASTOLIC BLOOD PRESSURE: 80 MMHG | BODY MASS INDEX: 32.07 KG/M2 | SYSTOLIC BLOOD PRESSURE: 120 MMHG

## 2018-11-13 DIAGNOSIS — Z3A.14 14 WEEKS GESTATION OF PREGNANCY: Primary | ICD-10-CM

## 2018-11-13 DIAGNOSIS — Z23 NEED FOR INFLUENZA VACCINATION: ICD-10-CM

## 2018-11-13 PROCEDURE — 0502F SUBSEQUENT PRENATAL CARE: CPT | Performed by: FAMILY MEDICINE

## 2018-11-13 PROCEDURE — 90686 IIV4 VACC NO PRSV 0.5 ML IM: CPT | Performed by: FAMILY MEDICINE

## 2018-11-13 PROCEDURE — 90471 IMMUNIZATION ADMIN: CPT | Performed by: FAMILY MEDICINE

## 2018-11-13 NOTE — LETTER
November 13, 2018     [address] [facility] [address] [place] [address]    Patient: Mehrdad Nunez   YOB: 1985   Date of Visit: 11/13/2018       To Whom it May Concern: Thank you for referring Mehrdad Nunez to me for evaluation  Below are the relevant portions of my assessment and plan of care  Pt mentions need to local anesthetic for wisdom tooth extraction  Local anesthetic is typically safe for pregnant patients and appropriate  If you have questions, please do not hesitate to call me  I look forward to following Luda Jones along with you           Sincerely,        Marco Girard MD        CC: No Recipients

## 2018-11-13 NOTE — PROGRESS NOTES
OB/GYN  PN Visit  Maxine Carroll  [id number]  11/13/2018  4:05 PM  Dr Florentino Varela MD    S: 29 y o  [id number] 14w5d here for PN visit  OB complaints:  Ctxns:   Leakage:   Bleeding:   Fetal movement:     She reports   O:  Vitals:    11/13/18 1548   BP: 120/80       Gen: no acute distress, nonlabored breathing,   OB exam completed: fundal height, FHTs, ultrasound if indicated    A/P:  #1  14w5d GESTATION  Labor precautions reviewed  Fetal kick counts reviewed  RTC in  weeks    #2  #3  No future appointments        16-18 weeks: sequential screening, level II ultrasound order, flu vaccine  26-28 weeks: 28 week labs (CBC, RPR, 1hr GTT), Rh status/rhogam, FKC, flu vaccine  28-32 weeks: tdap, flu vaccine  32 weeks: tdap, flu vaccine, check in card, rediscuss contraception, birth plan  36 weeks: collect GBS/PCN allergy?, flu vaccine    Florentino Varela MD  11/13/2018  4:05 PM

## 2018-11-13 NOTE — PROGRESS NOTES
OB/GYN  PN Visit  Oh Sandhu  [id number]  11/13/2018  5:22 PM  Dr Deric Gonsalez MD    S: 29 y o  [id number] 14w5d here for PN visit  OB complaints:  Ctxns:  None  Leakage: None  Bleeding: None  Fetal movement:  Some intermittent fetal movement (early)  She reports need for wisdom tooth extraction  Patient requests a letter allowing local anesthetic for tooth extraction  O:  Vitals:    11/13/18 1548   BP: 120/80       Gen: no acute distress, nonlabored breathing,  OB exam completed: fundal height, FHTs    A/P:  #1  14w5d GESTATION  - FHR:  140's  - fundal height:  14 cm    Labor precautions reviewed  Fetal kick counts reviewed,    - patient mentioned she may be feeling some fetal movement  It is quite early  Reviewed fetal kick counts  RTC in 4 weeks    #2  Discussed quad screening with patient  - patient would like to be screened, provide referral today    #3    Patient in need of flu shot, provided flu shot today     Future Appointments  Date Time Provider Tram Murphy   12/18/2018 3:40 PM MD Marcellus Mcgee MD  11/13/2018  5:22 PM

## 2018-11-24 ENCOUNTER — APPOINTMENT (OUTPATIENT)
Dept: LAB | Facility: HOSPITAL | Age: 33
End: 2018-11-24
Payer: COMMERCIAL

## 2018-11-24 DIAGNOSIS — Z3A.14 14 WEEKS GESTATION OF PREGNANCY: ICD-10-CM

## 2018-11-24 PROCEDURE — 84702 CHORIONIC GONADOTROPIN TEST: CPT

## 2018-11-24 PROCEDURE — 82677 ASSAY OF ESTRIOL: CPT

## 2018-11-24 PROCEDURE — 82105 ALPHA-FETOPROTEIN SERUM: CPT

## 2018-11-24 PROCEDURE — 86336 INHIBIN A: CPT

## 2018-11-27 LAB
2ND TRIMESTER 4 SCREEN SERPL-IMP: NORMAL
2ND TRIMESTER 4 SCREEN SERPL-IMP: NORMAL
AFP ADJ MOM SERPL: 1.92
AFP SERPL-MCNC: 56.5 NG/ML
AGE AT DELIVERY: 32.7 YR
FET TS 18 RISK FROM MAT AGE: NORMAL
FET TS 21 RISK FROM MAT AGE: 468
GA METHOD: NORMAL
GA: 16.3 WEEKS
HCG ADJ MOM SERPL: 0.72
HCG SERPL-ACNC: NORMAL MIU/ML
IDDM PATIENT QL: NO
INHIBIN A ADJ MOM SERPL: 0.86
INHIBIN A SERPL-MCNC: 130.75 PG/ML
KARYOTYP BLD/T: NORMAL
MULTIPLE PREGNANCY: NO
NEURAL TUBE DEFECT RISK FETUS: 965 %
SERVICE CMNT-IMP: NORMAL
TS 18 RISK FETUS: NORMAL
TS 21 RISK FETUS: 4170
U ESTRIOL ADJ MOM SERPL: 0.82
U ESTRIOL SERPL-MCNC: 0.67 NG/ML

## 2018-12-18 ENCOUNTER — ROUTINE PRENATAL (OUTPATIENT)
Dept: FAMILY MEDICINE CLINIC | Facility: CLINIC | Age: 33
End: 2018-12-18

## 2018-12-18 VITALS — BODY MASS INDEX: 32.81 KG/M2 | SYSTOLIC BLOOD PRESSURE: 120 MMHG | DIASTOLIC BLOOD PRESSURE: 80 MMHG | WEIGHT: 186.4 LBS

## 2018-12-18 DIAGNOSIS — Z3A.19 19 WEEKS GESTATION OF PREGNANCY: Primary | ICD-10-CM

## 2018-12-18 PROCEDURE — 0502F SUBSEQUENT PRENATAL CARE: CPT | Performed by: FAMILY MEDICINE

## 2018-12-18 NOTE — LETTER
To whom it May concern:    Zaki Kramer is currently under my care  Patient's DARLENE is 05/09/2019 by LMP    Please feel free to contact me for any further communication             -VANIA Calvert

## 2018-12-18 NOTE — PROGRESS NOTES
OB/GYN  PN Visit  Bk Schuster  [id number]  12/18/2018  5:34 PM  Dr Adriana Al MD    S: 29 y o  [id number] 19w5d here for PN visit  HPI: 29 y o  [id number] at 19w5d here for PN visit  DARLENE: Estimated Date of Delivery: 5/9/19      OB Cx: none and pt denies      OB complaints: none  Ctxns: denies having uterine contractions  Leakage:   no LOF  Bleeding: no VB  Fetal movement:  Pt started feeling baby about a month ago, says it is intermittent, at baseline  Reviewed feels kick count  Currently pt does not keep track  She reports in need clearance for orders permit application and also needs a letter for [facility]  O:  Vitals:    12/18/18 1543   BP: 120/80       Gen:  No acute distress, nonlabored breathing,   HENT: EOM nl  Cardiovascular: Regular, Rate and Rhythm, no murmur, gallop or rub   Respiratory: Clear to Auscultation Bilaterally, no wheezing, rhonchi or rales    Abdominal: Soft  NT/ND  Gravid,  Neuro: AAOX3  Psychiatric: Normal mood and affect  Speech and behavior normal   OB exam completed: fundal height +1, FHTs: 132    A/P:  #1  19w5d GESTATION  Labor precautions reviewed  Fetal kick counts reviewed  RTC in 4 weeks    #2  Will order level II ultrasound, provided documentation       #3 Continue Prenatal Vitamins  - pt had flu vaccine at last visit  - pt would still like to have a tubal   - pt would no longer wants to consider TOLAC, would like repeat C section   - reviewed quad screen with pt    Future Appointments  Date Time Provider Tram Murphy   1/23/2019 3:30 PM MD Daniel Coleman MD  12/18/2018  5:34 PM

## 2018-12-21 ENCOUNTER — ROUTINE PRENATAL (OUTPATIENT)
Dept: PERINATAL CARE | Facility: CLINIC | Age: 33
End: 2018-12-21
Payer: COMMERCIAL

## 2018-12-21 VITALS
DIASTOLIC BLOOD PRESSURE: 63 MMHG | WEIGHT: 189 LBS | BODY MASS INDEX: 32.27 KG/M2 | SYSTOLIC BLOOD PRESSURE: 123 MMHG | HEIGHT: 64 IN

## 2018-12-21 DIAGNOSIS — O99.212 OBESITY COMPLICATING PREGNANCY IN SECOND TRIMESTER: Primary | ICD-10-CM

## 2018-12-21 DIAGNOSIS — Z36.86 ENCOUNTER FOR ANTENATAL SCREENING FOR CERVICAL LENGTH: ICD-10-CM

## 2018-12-21 DIAGNOSIS — Z3A.20 20 WEEKS GESTATION OF PREGNANCY: ICD-10-CM

## 2018-12-21 PROCEDURE — 76811 OB US DETAILED SNGL FETUS: CPT | Performed by: OBSTETRICS & GYNECOLOGY

## 2018-12-21 PROCEDURE — 99241 PR OFFICE CONSULTATION NEW/ESTAB PATIENT 15 MIN: CPT | Performed by: OBSTETRICS & GYNECOLOGY

## 2018-12-21 PROCEDURE — 76817 TRANSVAGINAL US OBSTETRIC: CPT | Performed by: OBSTETRICS & GYNECOLOGY

## 2018-12-21 NOTE — PROGRESS NOTES
A transvaginal ultrasound was performed  Sonographer note on use of High Level Disinfection Process (Trophon) for transvaginal probe# 4 used, serial [id number]    [address] [facility] [address] Michael Morgan

## 2018-12-21 NOTE — PROGRESS NOTES
Please refer to the [facility] ultrasound report in Ob Procedures for additional information regarding the visit to the [facility], [facility]  today

## 2019-01-23 ENCOUNTER — ROUTINE PRENATAL (OUTPATIENT)
Dept: FAMILY MEDICINE CLINIC | Facility: CLINIC | Age: 34
End: 2019-01-23

## 2019-01-23 VITALS — DIASTOLIC BLOOD PRESSURE: 80 MMHG | SYSTOLIC BLOOD PRESSURE: 120 MMHG | BODY MASS INDEX: 33.51 KG/M2 | WEIGHT: 195.2 LBS

## 2019-01-23 DIAGNOSIS — Z3A.24 24 WEEKS GESTATION OF PREGNANCY: Primary | ICD-10-CM

## 2019-01-23 DIAGNOSIS — Z3A.08 8 WEEKS GESTATION OF PREGNANCY: ICD-10-CM

## 2019-01-23 PROCEDURE — 99214 OFFICE O/P EST MOD 30 MIN: CPT | Performed by: FAMILY MEDICINE

## 2019-01-23 RX ORDER — PNV NO.95/FERROUS FUM/FOLIC AC 28MG-0.8MG
1 TABLET ORAL DAILY
Qty: 90 TABLET | Refills: 3 | Status: SHIPPED | OUTPATIENT
Start: 2019-01-23

## 2019-01-23 NOTE — PROGRESS NOTES
OB/GYN  PN Visit  Joe Applifier  [id number]  1/23/2019  4:51 PM  Dr Yovany Wylie MD    S: 29 y o  [id number] 24w6d here for PN visit  OB complaints:  Ctxns: None  Leakage: None  Bleeding: None  Fetal movement: WNL    She reports SOB associated with exertion  O:  Vitals:    01/23/19 1543   BP: 120/80     Physical Exam:   Gen: no acute distress, nonlabored breathing,   Constitutional:  A&O x3, well developed well nourished, in NAD  Neck:  Normal ROM,   CVS:  RRR, normal S1, S2  No murmurs, gallops, or rubs  Lungs:  CTABL, no wheezing, rhonchi, rales  Abdominal:  Bowel sounds positive, all 4 quadrants  Soft, nondistended nontender  Gravid   OB exam completed: fundal height: 23 cm, FHTs: 152    A/P:  #1  24w6d GESTATION  - Reviewed US  Results     #2  Patient is still interested in tubal ligation  Reviewed risks  Offered other contraceptive options  Signed consent for tubal ligation today  - Placed in scan bin  #3  Provided pt with lab slips to be completed in 2 weeks: CBC, type & cross, RPR, 1 hour glucola     #4   SOB  - physical exam wnl  - most likely related to pregnancy   - will follow up on CBC as well    Labor precautions reviewed  RTC in 4 weeks      Future Appointments  Date Time Provider Tram Murphy   2/15/2019 1:00 PM 2017 Manan Wyatt   2/27/2019 3:30 PM MD Juanita Elise MD  1/23/2019  4:51 PM

## 2019-02-15 ENCOUNTER — ULTRASOUND (OUTPATIENT)
Dept: PERINATAL CARE | Facility: CLINIC | Age: 34
End: 2019-02-15
Payer: COMMERCIAL

## 2019-02-15 ENCOUNTER — APPOINTMENT (OUTPATIENT)
Dept: LAB | Facility: HOSPITAL | Age: 34
End: 2019-02-15
Payer: COMMERCIAL

## 2019-02-15 VITALS
BODY MASS INDEX: 34.49 KG/M2 | WEIGHT: 202 LBS | SYSTOLIC BLOOD PRESSURE: 119 MMHG | DIASTOLIC BLOOD PRESSURE: 76 MMHG | HEART RATE: 80 BPM | HEIGHT: 64 IN

## 2019-02-15 DIAGNOSIS — O99.212 MATERNAL OBESITY, ANTEPARTUM, SECOND TRIMESTER: Primary | ICD-10-CM

## 2019-02-15 DIAGNOSIS — Z3A.24 24 WEEKS GESTATION OF PREGNANCY: ICD-10-CM

## 2019-02-15 DIAGNOSIS — Z3A.28 28 WEEKS GESTATION OF PREGNANCY: ICD-10-CM

## 2019-02-15 LAB
ABO GROUP BLD: NORMAL
BLD GP AB SCN SERPL QL: NEGATIVE
ERYTHROCYTE [DISTWIDTH] IN BLOOD BY AUTOMATED COUNT: 13.1 % (ref 11.6–15.1)
GLUCOSE 1H P 50 G GLC PO SERPL-MCNC: 126 MG/DL
HCT VFR BLD AUTO: 32 % (ref 34.8–46.1)
HGB BLD-MCNC: 10.5 G/DL (ref 11.5–15.4)
MCH RBC QN AUTO: 31.2 PG (ref 26.8–34.3)
MCHC RBC AUTO-ENTMCNC: 32.8 G/DL (ref 31.4–37.4)
MCV RBC AUTO: 95 FL (ref 82–98)
PLATELET # BLD AUTO: 211 THOUSANDS/UL (ref 149–390)
PMV BLD AUTO: 10.3 FL (ref 8.9–12.7)
RBC # BLD AUTO: 3.37 MILLION/UL (ref 3.81–5.12)
RH BLD: POSITIVE
SPECIMEN EXPIRATION DATE: NORMAL
WBC # BLD AUTO: 10.37 THOUSAND/UL (ref 4.31–10.16)

## 2019-02-15 PROCEDURE — 86901 BLOOD TYPING SEROLOGIC RH(D): CPT

## 2019-02-15 PROCEDURE — 86592 SYPHILIS TEST NON-TREP QUAL: CPT

## 2019-02-15 PROCEDURE — 85027 COMPLETE CBC AUTOMATED: CPT

## 2019-02-15 PROCEDURE — 99212 OFFICE O/P EST SF 10 MIN: CPT | Performed by: OBSTETRICS & GYNECOLOGY

## 2019-02-15 PROCEDURE — 36415 COLL VENOUS BLD VENIPUNCTURE: CPT

## 2019-02-15 PROCEDURE — 86850 RBC ANTIBODY SCREEN: CPT

## 2019-02-15 PROCEDURE — 86900 BLOOD TYPING SEROLOGIC ABO: CPT

## 2019-02-15 PROCEDURE — 76816 OB US FOLLOW-UP PER FETUS: CPT | Performed by: OBSTETRICS & GYNECOLOGY

## 2019-02-15 PROCEDURE — 82950 GLUCOSE TEST: CPT

## 2019-02-15 NOTE — PROGRESS NOTES
Please refer to the [facility] ultrasound report in Ob Procedures for additional information regarding the visit to the [facility] the [facility], [facility]  today

## 2019-02-15 NOTE — LETTER
February 15, 2019     Pawel Hills MD  [address] [place], [place] [address]    Patient: Joe Thurston   YOB: 1985   Date of Visit: 2/15/2019       Dear Dr Christiano Bui:    Thank you for referring Joe Thurston to me for evaluation  Below are my notes for this consultation  If you have questions, please do not hesitate to call me  I look forward to following your patient along with you  Sincerely,         [facility]        CC: No Recipients  Cade Toro MD  2/15/2019  1:47 PM  Sign at close encounter  Please refer to the [facility] ultrasound report in Ob Procedures for additional information regarding the visit to the [facility]  today

## 2019-02-18 LAB — RPR SER QL: NORMAL

## 2019-03-06 ENCOUNTER — ROUTINE PRENATAL (OUTPATIENT)
Dept: FAMILY MEDICINE CLINIC | Facility: CLINIC | Age: 34
End: 2019-03-06

## 2019-03-06 VITALS — WEIGHT: 205 LBS | SYSTOLIC BLOOD PRESSURE: 120 MMHG | DIASTOLIC BLOOD PRESSURE: 80 MMHG | BODY MASS INDEX: 35.19 KG/M2

## 2019-03-06 DIAGNOSIS — Z23 ENCOUNTER FOR IMMUNIZATION: ICD-10-CM

## 2019-03-06 DIAGNOSIS — Z3A.30 30 WEEKS GESTATION OF PREGNANCY: Primary | ICD-10-CM

## 2019-03-06 PROCEDURE — 90715 TDAP VACCINE 7 YRS/> IM: CPT | Performed by: FAMILY MEDICINE

## 2019-03-06 PROCEDURE — 99214 OFFICE O/P EST MOD 30 MIN: CPT | Performed by: FAMILY MEDICINE

## 2019-03-06 PROCEDURE — 3725F SCREEN DEPRESSION PERFORMED: CPT | Performed by: FAMILY MEDICINE

## 2019-03-06 PROCEDURE — 90471 IMMUNIZATION ADMIN: CPT | Performed by: FAMILY MEDICINE

## 2019-03-06 NOTE — PATIENT INSTRUCTIONS

## 2019-03-18 NOTE — PROGRESS NOTES
OB/GYN  PN Visit  Yesenia Morrison  [id number]  3/20/2019  4:10 PM  Dr Casey Arce MD    S: 29 y o  Jayy Whitmore here for PN visit  OB complaints:  Ctxns:  None  Leakage:  None  Bleeding:  None  Fetal movement:  "All the time" however patient does not formally monitor fetal kick counts    She reports baby is moving all a time   O:  Vitals:    19 1532   BP: 122/80       Gen: no acute distress, nonlabored breathing,   OB exam completed: fundal height 33 FHTs 138    A/P:  #1  32w4d GESTATION  Labor precautions reviewed  Fetal kick counts reviewed, discussed the importance of monitoring fetal movement  RTC in 2 weeks with SWOB - to discuss  and tubal ligation  Advised to schedule next appointment with [facility], referral form provided with phone number for clinic  #2  Tdap provided at last visit 19, Flu vaccine provided 18  #3  Pt continues to desire tubal ligation, consent signed on 19  Pt plans on having RLTCS  Will schedule appointment with SWOB  Tubal ligation currently signed in [place]  Patient expresses complete understanding of forms  May consider signing [place] tubal ligation consent with OB  #4  Check in card provided          Future Appointments   Date Time Provider Tram Murphy   3/29/2019 10:00 AM  [facility] [address] [place] [address]   2019  3:30 PM Casey Arce MD  Randall Srivastava MD  3/20/2019  4:10 PM

## 2019-03-20 ENCOUNTER — ROUTINE PRENATAL (OUTPATIENT)
Dept: FAMILY MEDICINE CLINIC | Facility: CLINIC | Age: 34
End: 2019-03-20

## 2019-03-20 VITALS — WEIGHT: 207 LBS | SYSTOLIC BLOOD PRESSURE: 122 MMHG | DIASTOLIC BLOOD PRESSURE: 80 MMHG | BODY MASS INDEX: 35.53 KG/M2

## 2019-03-20 DIAGNOSIS — Z3A.32 32 WEEKS GESTATION OF PREGNANCY: Primary | ICD-10-CM

## 2019-03-20 PROCEDURE — 99214 OFFICE O/P EST MOD 30 MIN: CPT | Performed by: FAMILY MEDICINE

## 2019-03-20 NOTE — PATIENT INSTRUCTIONS
Patient is to schedule an appointment in 2 weeks with SW OB  RTC in 2 weeks after that visit; 1 month    Por favor have kurt linda con SWOB in 2 semanas y in 4 semanas conmigo

## 2019-03-25 RX ORDER — PNV,CALCIUM 72/IRON/FOLIC ACID 27 MG-1 MG
1 TABLET ORAL DAILY
Refills: 3 | COMMUNITY
Start: 2019-03-06 | End: 2019-04-02 | Stop reason: SDUPTHER

## 2019-03-29 ENCOUNTER — ULTRASOUND (OUTPATIENT)
Dept: PERINATAL CARE | Facility: CLINIC | Age: 34
End: 2019-03-29
Payer: COMMERCIAL

## 2019-03-29 VITALS
BODY MASS INDEX: 35.71 KG/M2 | DIASTOLIC BLOOD PRESSURE: 76 MMHG | HEART RATE: 98 BPM | SYSTOLIC BLOOD PRESSURE: 114 MMHG | WEIGHT: 209.2 LBS | HEIGHT: 64 IN

## 2019-03-29 DIAGNOSIS — O99.210 OBESITY AFFECTING PREGNANCY, ANTEPARTUM: Primary | ICD-10-CM

## 2019-03-29 DIAGNOSIS — Z3A.34 34 WEEKS GESTATION OF PREGNANCY: ICD-10-CM

## 2019-03-29 PROCEDURE — 76816 OB US FOLLOW-UP PER FETUS: CPT | Performed by: OBSTETRICS & GYNECOLOGY

## 2019-03-29 NOTE — PROGRESS NOTES
The patient was seen today for an ultrasound  Please see ultrasound report (located under Ob Procedures) for additional details  Thank you very much for allowing us to participate in the care of this very nice patient  Should you have any questions, please do not hesitate to contact me  Triston Neville MD [address] [facility]  Attending Physician, Jenn

## 2019-03-29 NOTE — PATIENT INSTRUCTIONS
Kick Counts in Pregnancy   AMBULATORY CARE:   Kick counts  measure how much your baby is moving in your womb  A kick from your baby can be felt as a twist, turn, swish, roll, or jab  It is common to feel your baby kicking at 26 to 28 weeks of pregnancy  You may feel your baby kick as early as 20 weeks of pregnancy  Seek care immediately if:   · You feel your baby kick less as the day goes on      · You do not feel any kicks in a day  Contact your healthcare provider if:   · You feel a change in the number of kicks or movements of your baby  · You feel fewer than 10 kicks within 2 hours after counting  · You have questions or concerns about your baby's movements  Why measure kick counts:  Your baby's movement may provide information about your baby's health  He may move less, or not at all, if there are problems  He may move less if he does not have enough room to grow in your uterus (womb)  He may also move less if he is not getting enough oxygen or nutrition from the placenta  Tell your healthcare provider as soon as you feel a change in your baby's movements  Problems that are found earlier are easier to treat  When to measure kick counts:   · Measure kick counts at the same time every day  · Measure kick counts when your baby is awake and most active  Your baby may be most active in the evening  · Measure kick counts after a meal or snack or when your baby is usually most active  Your baby may be more active after you eat or in the evening  · You should not smoke while you are pregnant  Smoking increases the risk of health problems for you and for your baby during your pregnancy  If you do smoke, wait 2 hours to measure kick counts  Nicotine can make your baby more active than usual   How to measure kick counts:  Check that your baby is awake before you measure kick counts  You can wake up your baby by lightly pushing on your belly, walking, or drinking something cold   Your healthcare provider may tell you different ways to measure kick counts  He/She may tell you to do the following:  · Use a chart or clock to keep track of the time you start and finish counting  · Sit in a chair or lie on your left side  · Place your hands on the largest part of your belly  · Count until you reach 10 kicks  Write down how much time it takes to count 10 kicks  · It may take 30 minutes to 2 hours to count 10 kicks  It should not take more than 2 hours to count 10 kicks  If you do not feel 10 kicks within 2 hours, Call your Obstetrician    Follow up with your healthcare provider as directed:  Write down your questions so you remember to ask them during your visits  © 2017 [facility] Information is for End User's use only and may not be sold, redistributed or otherwise used for commercial purposes  All illustrations and images included in CareNotes® are the copyrighted property of A D A M , Inc  or Fritz Rogel  The above information is an  only  It is not intended as medical advice for individual conditions or treatments  Talk to your doctor, nurse or pharmacist before following any medical regimen to see if it is safe and effective for you

## 2019-04-02 ENCOUNTER — ROUTINE PRENATAL (OUTPATIENT)
Dept: OBGYN CLINIC | Facility: CLINIC | Age: 34
End: 2019-04-02

## 2019-04-02 VITALS
WEIGHT: 210 LBS | SYSTOLIC BLOOD PRESSURE: 109 MMHG | BODY MASS INDEX: 35.85 KG/M2 | HEART RATE: 80 BPM | DIASTOLIC BLOOD PRESSURE: 74 MMHG | HEIGHT: 64 IN

## 2019-04-02 DIAGNOSIS — Z30.2 REQUEST FOR STERILIZATION: ICD-10-CM

## 2019-04-02 DIAGNOSIS — Z3A.34 34 WEEKS GESTATION OF PREGNANCY: Primary | ICD-10-CM

## 2019-04-02 PROCEDURE — 99213 OFFICE O/P EST LOW 20 MIN: CPT | Performed by: OBSTETRICS & GYNECOLOGY

## 2019-04-17 ENCOUNTER — ROUTINE PRENATAL (OUTPATIENT)
Dept: FAMILY MEDICINE CLINIC | Facility: CLINIC | Age: 34
End: 2019-04-17

## 2019-04-17 VITALS — WEIGHT: 212 LBS | BODY MASS INDEX: 36.39 KG/M2 | SYSTOLIC BLOOD PRESSURE: 120 MMHG | DIASTOLIC BLOOD PRESSURE: 80 MMHG

## 2019-04-17 DIAGNOSIS — Z3A.36 36 WEEKS GESTATION OF PREGNANCY: ICD-10-CM

## 2019-04-17 DIAGNOSIS — Z30.2 REQUEST FOR STERILIZATION: ICD-10-CM

## 2019-04-17 PROCEDURE — 99213 OFFICE O/P EST LOW 20 MIN: CPT | Performed by: FAMILY MEDICINE

## 2019-04-17 PROCEDURE — 87653 STREP B DNA AMP PROBE: CPT | Performed by: FAMILY MEDICINE

## 2019-04-19 LAB — GP B STREP DNA SPEC QL NAA+PROBE: ABNORMAL

## 2019-04-24 ENCOUNTER — ROUTINE PRENATAL (OUTPATIENT)
Dept: FAMILY MEDICINE CLINIC | Facility: CLINIC | Age: 34
End: 2019-04-24

## 2019-04-24 VITALS
TEMPERATURE: 97.8 F | WEIGHT: 215.4 LBS | HEART RATE: 76 BPM | RESPIRATION RATE: 18 BRPM | DIASTOLIC BLOOD PRESSURE: 74 MMHG | BODY MASS INDEX: 36.97 KG/M2 | SYSTOLIC BLOOD PRESSURE: 110 MMHG

## 2019-04-24 DIAGNOSIS — Z30.2 REQUEST FOR STERILIZATION: ICD-10-CM

## 2019-04-24 DIAGNOSIS — Z3A.36 36 WEEKS GESTATION OF PREGNANCY: ICD-10-CM

## 2019-04-24 PROCEDURE — 99214 OFFICE O/P EST MOD 30 MIN: CPT | Performed by: FAMILY MEDICINE

## 2019-05-01 ENCOUNTER — ANESTHESIA EVENT (INPATIENT)
Dept: LABOR AND DELIVERY | Facility: HOSPITAL | Age: 34
DRG: 540 | End: 2019-05-01
Payer: COMMERCIAL

## 2019-05-02 ENCOUNTER — ANESTHESIA (INPATIENT)
Dept: LABOR AND DELIVERY | Facility: HOSPITAL | Age: 34
DRG: 540 | End: 2019-05-02
Payer: COMMERCIAL

## 2019-05-02 ENCOUNTER — HOSPITAL ENCOUNTER (INPATIENT)
Facility: HOSPITAL | Age: 34
LOS: 3 days | Discharge: HOME | DRG: 540 | End: 2019-05-05
Attending: OBSTETRICS & GYNECOLOGY | Admitting: OBSTETRICS & GYNECOLOGY
Payer: COMMERCIAL

## 2019-05-02 DIAGNOSIS — Z3A.39 39 WEEKS GESTATION OF PREGNANCY: Primary | ICD-10-CM

## 2019-05-02 DIAGNOSIS — Z98.891 S/P REPEAT LOW TRANSVERSE C-SECTION: ICD-10-CM

## 2019-05-02 DIAGNOSIS — O34.219 PREVIOUS CESAREAN SECTION COMPLICATING PREGNANCY: ICD-10-CM

## 2019-05-02 PROBLEM — Z90.79 STATUS POST BILATERAL SALPINGECTOMY: Status: ACTIVE | Noted: 2019-05-02

## 2019-05-02 LAB
ABO GROUP BLD: NORMAL
BASE EXCESS BLDCOA CALC-SCNC: -3.5 MMOL/L (ref 3–11)
BASE EXCESS BLDCOV CALC-SCNC: -1.9 MMOL/L (ref 1–9)
BASOPHILS # BLD AUTO: 0.05 THOUSANDS/ΜL (ref 0–0.1)
BASOPHILS NFR BLD AUTO: 1 % (ref 0–1)
BLD GP AB SCN SERPL QL: NEGATIVE
EOSINOPHIL # BLD AUTO: 0.06 THOUSAND/ΜL (ref 0–0.61)
EOSINOPHIL NFR BLD AUTO: 1 % (ref 0–6)
ERYTHROCYTE [DISTWIDTH] IN BLOOD BY AUTOMATED COUNT: 12.9 % (ref 11.6–15.1)
HCO3 BLDCOA-SCNC: 24.3 MMOL/L (ref 17.3–27.3)
HCO3 BLDCOV-SCNC: 24.4 MMOL/L (ref 12.2–28.6)
HCT VFR BLD AUTO: 32.6 % (ref 34.8–46.1)
HGB BLD-MCNC: 10.8 G/DL (ref 11.5–15.4)
IMM GRANULOCYTES # BLD AUTO: 0.09 THOUSAND/UL (ref 0–0.2)
IMM GRANULOCYTES NFR BLD AUTO: 1 % (ref 0–2)
LYMPHOCYTES # BLD AUTO: 2.08 THOUSANDS/ΜL (ref 0.6–4.47)
LYMPHOCYTES NFR BLD AUTO: 21 % (ref 14–44)
MCH RBC QN AUTO: 31.2 PG (ref 26.8–34.3)
MCHC RBC AUTO-ENTMCNC: 33.1 G/DL (ref 31.4–37.4)
MCV RBC AUTO: 94 FL (ref 82–98)
MONOCYTES # BLD AUTO: 0.64 THOUSAND/ΜL (ref 0.17–1.22)
MONOCYTES NFR BLD AUTO: 6 % (ref 4–12)
NEUTROPHILS # BLD AUTO: 7.09 THOUSANDS/ΜL (ref 1.85–7.62)
NEUTS SEG NFR BLD AUTO: 70 % (ref 43–75)
NRBC BLD AUTO-RTO: 0 /100 WBCS
O2 CT VFR BLDCOA CALC: 4.7 ML/DL
OXYHGB MFR BLDCOA: 23 %
OXYHGB MFR BLDCOV: 53.9 %
PCO2 BLDCOA: 54.6 MM[HG] (ref 30–60)
PCO2 BLDCOV: 47.2 MM HG (ref 27–43)
PH BLDCOA: 7.27 [PH] (ref 7.23–7.43)
PH BLDCOV: 7.33 [PH] (ref 7.19–7.49)
PLATELET # BLD AUTO: 203 THOUSANDS/UL (ref 149–390)
PMV BLD AUTO: 9.7 FL (ref 8.9–12.7)
PO2 BLDCOA: 14.2 MM HG (ref 5–25)
PO2 BLDCOV: 22.9 MM HG (ref 15–45)
RBC # BLD AUTO: 3.46 MILLION/UL (ref 3.81–5.12)
RH BLD: POSITIVE
RPR SER QL: NORMAL
SAO2 % BLDCOV: 11 ML/DL
SPECIMEN EXPIRATION DATE: NORMAL
WBC # BLD AUTO: 10.01 THOUSAND/UL (ref 4.31–10.16)

## 2019-05-02 PROCEDURE — 59514 CESAREAN DELIVERY ONLY: CPT | Performed by: OBSTETRICS & GYNECOLOGY

## 2019-05-02 PROCEDURE — 82805 BLOOD GASES W/O2 SATURATION: CPT | Performed by: OBSTETRICS & GYNECOLOGY

## 2019-05-02 PROCEDURE — 88302 TISSUE EXAM BY PATHOLOGIST: CPT | Performed by: PATHOLOGY

## 2019-05-02 PROCEDURE — 86923 COMPATIBILITY TEST ELECTRIC: CPT

## 2019-05-02 PROCEDURE — 0UB70ZZ EXCISION OF BILATERAL FALLOPIAN TUBES, OPEN APPROACH: ICD-10-PCS | Performed by: OBSTETRICS & GYNECOLOGY

## 2019-05-02 PROCEDURE — 58611 LIGATE OVIDUCT(S) ADD-ON: CPT | Performed by: OBSTETRICS & GYNECOLOGY

## 2019-05-02 PROCEDURE — 85025 COMPLETE CBC W/AUTO DIFF WBC: CPT | Performed by: OBSTETRICS & GYNECOLOGY

## 2019-05-02 PROCEDURE — 10907ZC DRAINAGE OF AMNIOTIC FLUID, THERAPEUTIC FROM PRODUCTS OF CONCEPTION, VIA NATURAL OR ARTIFICIAL OPENING: ICD-10-PCS | Performed by: OBSTETRICS & GYNECOLOGY

## 2019-05-02 PROCEDURE — 86592 SYPHILIS TEST NON-TREP QUAL: CPT | Performed by: OBSTETRICS & GYNECOLOGY

## 2019-05-02 PROCEDURE — 94762 N-INVAS EAR/PLS OXIMTRY CONT: CPT

## 2019-05-02 PROCEDURE — 86900 BLOOD TYPING SEROLOGIC ABO: CPT | Performed by: OBSTETRICS & GYNECOLOGY

## 2019-05-02 PROCEDURE — 86901 BLOOD TYPING SEROLOGIC RH(D): CPT | Performed by: OBSTETRICS & GYNECOLOGY

## 2019-05-02 PROCEDURE — NC001 PR NO CHARGE: Performed by: OBSTETRICS & GYNECOLOGY

## 2019-05-02 PROCEDURE — 86850 RBC ANTIBODY SCREEN: CPT | Performed by: OBSTETRICS & GYNECOLOGY

## 2019-05-02 PROCEDURE — 4A1HXCZ MONITORING OF PRODUCTS OF CONCEPTION, CARDIAC RATE, EXTERNAL APPROACH: ICD-10-PCS | Performed by: OBSTETRICS & GYNECOLOGY

## 2019-05-02 RX ORDER — NALBUPHINE HCL 10 MG/ML
5 AMPUL (ML) INJECTION
Status: DISPENSED | OUTPATIENT
Start: 2019-05-02 | End: 2019-05-03

## 2019-05-02 RX ORDER — METOCLOPRAMIDE HYDROCHLORIDE 5 MG/ML
10 INJECTION INTRAMUSCULAR; INTRAVENOUS ONCE AS NEEDED
Status: DISCONTINUED | OUTPATIENT
Start: 2019-05-02 | End: 2019-05-03

## 2019-05-02 RX ORDER — HYDROMORPHONE HCL/PF 1 MG/ML
0.5 SYRINGE (ML) INJECTION
Status: ACTIVE | OUTPATIENT
Start: 2019-05-02 | End: 2019-05-03

## 2019-05-02 RX ORDER — CEFAZOLIN SODIUM 2 G/50ML
2000 SOLUTION INTRAVENOUS ONCE
Status: COMPLETED | OUTPATIENT
Start: 2019-05-02 | End: 2019-05-02

## 2019-05-02 RX ORDER — LIDOCAINE HYDROCHLORIDE 10 MG/ML
INJECTION, SOLUTION INFILTRATION; PERINEURAL AS NEEDED
Status: DISCONTINUED | OUTPATIENT
Start: 2019-05-02 | End: 2019-05-02 | Stop reason: SURG

## 2019-05-02 RX ORDER — FENTANYL CITRATE 50 UG/ML
INJECTION, SOLUTION INTRAMUSCULAR; INTRAVENOUS AS NEEDED
Status: DISCONTINUED | OUTPATIENT
Start: 2019-05-02 | End: 2019-05-02 | Stop reason: SURG

## 2019-05-02 RX ORDER — IBUPROFEN 600 MG/1
600 TABLET ORAL EVERY 6 HOURS PRN
Status: DISCONTINUED | OUTPATIENT
Start: 2019-05-02 | End: 2019-05-05 | Stop reason: HOSPADM

## 2019-05-02 RX ORDER — FENTANYL CITRATE/PF 50 MCG/ML
25 SYRINGE (ML) INJECTION
Status: DISCONTINUED | OUTPATIENT
Start: 2019-05-02 | End: 2019-05-03

## 2019-05-02 RX ORDER — NALOXONE HYDROCHLORIDE 0.4 MG/ML
0.1 INJECTION, SOLUTION INTRAMUSCULAR; INTRAVENOUS; SUBCUTANEOUS
Status: ACTIVE | OUTPATIENT
Start: 2019-05-02 | End: 2019-05-03

## 2019-05-02 RX ORDER — HYDROMORPHONE HCL/PF 1 MG/ML
0.2 SYRINGE (ML) INJECTION
Status: DISCONTINUED | OUTPATIENT
Start: 2019-05-02 | End: 2019-05-03

## 2019-05-02 RX ORDER — DIPHENHYDRAMINE HCL 25 MG
25 TABLET ORAL EVERY 6 HOURS PRN
Status: DISCONTINUED | OUTPATIENT
Start: 2019-05-03 | End: 2019-05-05 | Stop reason: HOSPADM

## 2019-05-02 RX ORDER — ONDANSETRON 2 MG/ML
INJECTION INTRAMUSCULAR; INTRAVENOUS AS NEEDED
Status: DISCONTINUED | OUTPATIENT
Start: 2019-05-02 | End: 2019-05-02 | Stop reason: SURG

## 2019-05-02 RX ORDER — PROMETHAZINE HYDROCHLORIDE 25 MG/ML
12.5 INJECTION, SOLUTION INTRAMUSCULAR; INTRAVENOUS ONCE AS NEEDED
Status: DISCONTINUED | OUTPATIENT
Start: 2019-05-02 | End: 2019-05-03

## 2019-05-02 RX ORDER — ONDANSETRON 2 MG/ML
4 INJECTION INTRAMUSCULAR; INTRAVENOUS ONCE AS NEEDED
Status: DISCONTINUED | OUTPATIENT
Start: 2019-05-02 | End: 2019-05-03

## 2019-05-02 RX ORDER — KETOROLAC TROMETHAMINE 30 MG/ML
INJECTION, SOLUTION INTRAMUSCULAR; INTRAVENOUS AS NEEDED
Status: DISCONTINUED | OUTPATIENT
Start: 2019-05-02 | End: 2019-05-02 | Stop reason: SURG

## 2019-05-02 RX ORDER — MORPHINE SULFATE 0.5 MG/ML
INJECTION, SOLUTION EPIDURAL; INTRATHECAL; INTRAVENOUS AS NEEDED
Status: DISCONTINUED | OUTPATIENT
Start: 2019-05-02 | End: 2019-05-02 | Stop reason: SURG

## 2019-05-02 RX ORDER — OXYTOCIN/RINGER'S LACTATE 30/500 ML
PLASTIC BAG, INJECTION (ML) INTRAVENOUS CONTINUOUS PRN
Status: DISCONTINUED | OUTPATIENT
Start: 2019-05-02 | End: 2019-05-02 | Stop reason: SURG

## 2019-05-02 RX ORDER — HYDRALAZINE HYDROCHLORIDE 20 MG/ML
5 INJECTION INTRAMUSCULAR; INTRAVENOUS
Status: DISCONTINUED | OUTPATIENT
Start: 2019-05-02 | End: 2019-05-03

## 2019-05-02 RX ORDER — OXYTOCIN/RINGER'S LACTATE 30/500 ML
62.5 PLASTIC BAG, INJECTION (ML) INTRAVENOUS CONTINUOUS
Status: DISPENSED | OUTPATIENT
Start: 2019-05-02 | End: 2019-05-02

## 2019-05-02 RX ORDER — KETOROLAC TROMETHAMINE 30 MG/ML
15 INJECTION, SOLUTION INTRAMUSCULAR; INTRAVENOUS EVERY 6 HOURS
Status: DISCONTINUED | OUTPATIENT
Start: 2019-05-02 | End: 2019-05-03

## 2019-05-02 RX ORDER — OXYCODONE HYDROCHLORIDE AND ACETAMINOPHEN 5; 325 MG/1; MG/1
1 TABLET ORAL EVERY 4 HOURS PRN
Status: DISCONTINUED | OUTPATIENT
Start: 2019-05-03 | End: 2019-05-05 | Stop reason: HOSPADM

## 2019-05-02 RX ORDER — DIPHENHYDRAMINE HYDROCHLORIDE 50 MG/ML
25 INJECTION INTRAMUSCULAR; INTRAVENOUS EVERY 6 HOURS PRN
Status: DISCONTINUED | OUTPATIENT
Start: 2019-05-02 | End: 2019-05-03

## 2019-05-02 RX ORDER — SODIUM CHLORIDE, SODIUM LACTATE, POTASSIUM CHLORIDE, CALCIUM CHLORIDE 600; 310; 30; 20 MG/100ML; MG/100ML; MG/100ML; MG/100ML
INJECTION, SOLUTION INTRAVENOUS CONTINUOUS PRN
Status: DISCONTINUED | OUTPATIENT
Start: 2019-05-02 | End: 2019-05-02 | Stop reason: SURG

## 2019-05-02 RX ORDER — ACETAMINOPHEN 325 MG/1
650 TABLET ORAL EVERY 6 HOURS PRN
Status: DISCONTINUED | OUTPATIENT
Start: 2019-05-02 | End: 2019-05-05 | Stop reason: HOSPADM

## 2019-05-02 RX ORDER — SIMETHICONE 80 MG
80 TABLET,CHEWABLE ORAL EVERY 6 HOURS PRN
Status: DISCONTINUED | OUTPATIENT
Start: 2019-05-02 | End: 2019-05-05 | Stop reason: HOSPADM

## 2019-05-02 RX ORDER — CALCIUM CARBONATE 200(500)MG
1000 TABLET,CHEWABLE ORAL DAILY PRN
Status: DISCONTINUED | OUTPATIENT
Start: 2019-05-02 | End: 2019-05-05 | Stop reason: HOSPADM

## 2019-05-02 RX ORDER — DEXAMETHASONE SODIUM PHOSPHATE 4 MG/ML
8 INJECTION, SOLUTION INTRA-ARTICULAR; INTRALESIONAL; INTRAMUSCULAR; INTRAVENOUS; SOFT TISSUE ONCE AS NEEDED
Status: ACTIVE | OUTPATIENT
Start: 2019-05-02 | End: 2019-05-03

## 2019-05-02 RX ORDER — BUPIVACAINE HYDROCHLORIDE 7.5 MG/ML
INJECTION, SOLUTION INTRASPINAL AS NEEDED
Status: DISCONTINUED | OUTPATIENT
Start: 2019-05-02 | End: 2019-05-02 | Stop reason: SURG

## 2019-05-02 RX ORDER — METOCLOPRAMIDE HYDROCHLORIDE 5 MG/ML
5 INJECTION INTRAMUSCULAR; INTRAVENOUS EVERY 6 HOURS PRN
Status: ACTIVE | OUTPATIENT
Start: 2019-05-02 | End: 2019-05-03

## 2019-05-02 RX ORDER — LABETALOL 20 MG/4 ML (5 MG/ML) INTRAVENOUS SYRINGE
10
Status: DISCONTINUED | OUTPATIENT
Start: 2019-05-02 | End: 2019-05-05 | Stop reason: HOSPADM

## 2019-05-02 RX ORDER — ACETAMINOPHEN 325 MG/1
650 TABLET ORAL EVERY 6 HOURS
Status: DISCONTINUED | OUTPATIENT
Start: 2019-05-02 | End: 2019-05-05 | Stop reason: HOSPADM

## 2019-05-02 RX ORDER — SODIUM CHLORIDE, SODIUM LACTATE, POTASSIUM CHLORIDE, CALCIUM CHLORIDE 600; 310; 30; 20 MG/100ML; MG/100ML; MG/100ML; MG/100ML
125 INJECTION, SOLUTION INTRAVENOUS CONTINUOUS
Status: DISCONTINUED | OUTPATIENT
Start: 2019-05-02 | End: 2019-05-05 | Stop reason: HOSPADM

## 2019-05-02 RX ORDER — DOCUSATE SODIUM 100 MG/1
100 CAPSULE, LIQUID FILLED ORAL 2 TIMES DAILY
Status: DISCONTINUED | OUTPATIENT
Start: 2019-05-02 | End: 2019-05-05 | Stop reason: HOSPADM

## 2019-05-02 RX ORDER — ONDANSETRON 2 MG/ML
4 INJECTION INTRAMUSCULAR; INTRAVENOUS EVERY 4 HOURS PRN
Status: ACTIVE | OUTPATIENT
Start: 2019-05-02 | End: 2019-05-03

## 2019-05-02 RX ORDER — DIAPER,BRIEF,INFANT-TODD,DISP
1 EACH MISCELLANEOUS 4 TIMES DAILY PRN
Status: DISCONTINUED | OUTPATIENT
Start: 2019-05-02 | End: 2019-05-05 | Stop reason: HOSPADM

## 2019-05-02 RX ORDER — SODIUM CHLORIDE, SODIUM LACTATE, POTASSIUM CHLORIDE, CALCIUM CHLORIDE 600; 310; 30; 20 MG/100ML; MG/100ML; MG/100ML; MG/100ML
125 INJECTION, SOLUTION INTRAVENOUS CONTINUOUS
Status: DISCONTINUED | OUTPATIENT
Start: 2019-05-02 | End: 2019-05-03

## 2019-05-02 RX ORDER — HYDROMORPHONE HCL/PF 1 MG/ML
0.5 SYRINGE (ML) INJECTION
Status: DISCONTINUED | OUTPATIENT
Start: 2019-05-02 | End: 2019-05-03

## 2019-05-02 RX ORDER — OXYCODONE HYDROCHLORIDE AND ACETAMINOPHEN 5; 325 MG/1; MG/1
2 TABLET ORAL EVERY 4 HOURS PRN
Status: DISCONTINUED | OUTPATIENT
Start: 2019-05-03 | End: 2019-05-05 | Stop reason: HOSPADM

## 2019-05-02 RX ORDER — ALBUTEROL SULFATE 2.5 MG/3ML
2.5 SOLUTION RESPIRATORY (INHALATION) ONCE AS NEEDED
Status: DISCONTINUED | OUTPATIENT
Start: 2019-05-02 | End: 2019-05-03

## 2019-05-02 RX ADMIN — NALBUPHINE HYDROCHLORIDE 5 MG: 10 INJECTION, SOLUTION INTRAMUSCULAR; INTRAVENOUS; SUBCUTANEOUS at 20:20

## 2019-05-02 RX ADMIN — MORPHINE SULFATE 0.15 MG: 0.5 INJECTION, SOLUTION EPIDURAL; INTRATHECAL; INTRAVENOUS at 08:32

## 2019-05-02 RX ADMIN — DOCUSATE SODIUM 100 MG: 100 CAPSULE, LIQUID FILLED ORAL at 17:54

## 2019-05-02 RX ADMIN — CEFAZOLIN SODIUM 2000 MG: 2 SOLUTION INTRAVENOUS at 08:36

## 2019-05-02 RX ADMIN — ONDANSETRON 4 MG: 2 INJECTION INTRAMUSCULAR; INTRAVENOUS at 08:58

## 2019-05-02 RX ADMIN — NALBUPHINE HYDROCHLORIDE 5 MG: 10 INJECTION, SOLUTION INTRAMUSCULAR; INTRAVENOUS; SUBCUTANEOUS at 10:07

## 2019-05-02 RX ADMIN — KETOROLAC TROMETHAMINE 15 MG: 30 INJECTION, SOLUTION INTRAMUSCULAR at 23:45

## 2019-05-02 RX ADMIN — SODIUM CHLORIDE, SODIUM LACTATE, POTASSIUM CHLORIDE, AND CALCIUM CHLORIDE: .6; .31; .03; .02 INJECTION, SOLUTION INTRAVENOUS at 08:22

## 2019-05-02 RX ADMIN — SODIUM CHLORIDE, SODIUM LACTATE, POTASSIUM CHLORIDE, AND CALCIUM CHLORIDE 999 ML/HR: .6; .31; .03; .02 INJECTION, SOLUTION INTRAVENOUS at 14:22

## 2019-05-02 RX ADMIN — SODIUM CHLORIDE, SODIUM LACTATE, POTASSIUM CHLORIDE, AND CALCIUM CHLORIDE 125 ML/HR: .6; .31; .03; .02 INJECTION, SOLUTION INTRAVENOUS at 19:27

## 2019-05-02 RX ADMIN — PHENYLEPHRINE HYDROCHLORIDE 200 MCG: 10 INJECTION INTRAVENOUS at 08:39

## 2019-05-02 RX ADMIN — SODIUM CHLORIDE, SODIUM LACTATE, POTASSIUM CHLORIDE, AND CALCIUM CHLORIDE 125 ML/HR: .6; .31; .03; .02 INJECTION, SOLUTION INTRAVENOUS at 07:24

## 2019-05-02 RX ADMIN — KETOROLAC TROMETHAMINE 30 MG: 30 INJECTION, SOLUTION INTRAMUSCULAR at 09:43

## 2019-05-02 RX ADMIN — Medication 62.5 MILLI-UNITS/MIN: at 11:08

## 2019-05-02 RX ADMIN — LIDOCAINE HYDROCHLORIDE 0.5 ML: 10 INJECTION, SOLUTION INFILTRATION; PERINEURAL at 08:30

## 2019-05-02 RX ADMIN — FENTANYL CITRATE 15 MCG: 50 INJECTION, SOLUTION INTRAMUSCULAR; INTRAVENOUS at 08:32

## 2019-05-02 RX ADMIN — PHENYLEPHRINE HYDROCHLORIDE 100 MCG/MIN: 10 INJECTION INTRAVENOUS at 08:35

## 2019-05-02 RX ADMIN — SODIUM CHLORIDE, SODIUM LACTATE, POTASSIUM CHLORIDE, AND CALCIUM CHLORIDE: .6; .31; .03; .02 INJECTION, SOLUTION INTRAVENOUS at 09:25

## 2019-05-02 RX ADMIN — ACETAMINOPHEN 650 MG: 325 TABLET ORAL at 17:45

## 2019-05-02 RX ADMIN — BUPIVACAINE HYDROCHLORIDE IN DEXTROSE 1.8 ML: 7.5 INJECTION, SOLUTION SUBARACHNOID at 08:32

## 2019-05-02 RX ADMIN — KETOROLAC TROMETHAMINE 15 MG: 30 INJECTION, SOLUTION INTRAMUSCULAR at 17:44

## 2019-05-02 RX ADMIN — Medication 250 MILLI-UNITS/MIN: at 08:56

## 2019-05-02 RX ADMIN — SODIUM CHLORIDE, SODIUM LACTATE, POTASSIUM CHLORIDE, AND CALCIUM CHLORIDE 1000 ML: .6; .31; .03; .02 INJECTION, SOLUTION INTRAVENOUS at 06:57

## 2019-05-03 LAB
BASOPHILS # BLD AUTO: 0.04 THOUSANDS/ΜL (ref 0–0.1)
BASOPHILS NFR BLD AUTO: 0 % (ref 0–1)
EOSINOPHIL # BLD AUTO: 0.04 THOUSAND/ΜL (ref 0–0.61)
EOSINOPHIL NFR BLD AUTO: 0 % (ref 0–6)
ERYTHROCYTE [DISTWIDTH] IN BLOOD BY AUTOMATED COUNT: 13.2 % (ref 11.6–15.1)
HCT VFR BLD AUTO: 32.2 % (ref 34.8–46.1)
HGB BLD-MCNC: 10.4 G/DL (ref 11.5–15.4)
IMM GRANULOCYTES # BLD AUTO: 0.06 THOUSAND/UL (ref 0–0.2)
IMM GRANULOCYTES NFR BLD AUTO: 1 % (ref 0–2)
LYMPHOCYTES # BLD AUTO: 1.83 THOUSANDS/ΜL (ref 0.6–4.47)
LYMPHOCYTES NFR BLD AUTO: 17 % (ref 14–44)
MCH RBC QN AUTO: 31.2 PG (ref 26.8–34.3)
MCHC RBC AUTO-ENTMCNC: 32.3 G/DL (ref 31.4–37.4)
MCV RBC AUTO: 97 FL (ref 82–98)
MONOCYTES # BLD AUTO: 0.65 THOUSAND/ΜL (ref 0.17–1.22)
MONOCYTES NFR BLD AUTO: 6 % (ref 4–12)
NEUTROPHILS # BLD AUTO: 8.16 THOUSANDS/ΜL (ref 1.85–7.62)
NEUTS SEG NFR BLD AUTO: 76 % (ref 43–75)
NRBC BLD AUTO-RTO: 0 /100 WBCS
PLATELET # BLD AUTO: 177 THOUSANDS/UL (ref 149–390)
PMV BLD AUTO: 10 FL (ref 8.9–12.7)
RBC # BLD AUTO: 3.33 MILLION/UL (ref 3.81–5.12)
WBC # BLD AUTO: 10.78 THOUSAND/UL (ref 4.31–10.16)

## 2019-05-03 PROCEDURE — 85025 COMPLETE CBC W/AUTO DIFF WBC: CPT | Performed by: OBSTETRICS & GYNECOLOGY

## 2019-05-03 PROCEDURE — 99024 POSTOP FOLLOW-UP VISIT: CPT | Performed by: OBSTETRICS & GYNECOLOGY

## 2019-05-03 RX ORDER — DIPHENHYDRAMINE HCL 25 MG
25 TABLET ORAL EVERY 6 HOURS PRN
Status: DISCONTINUED | OUTPATIENT
Start: 2019-05-03 | End: 2019-05-05 | Stop reason: HOSPADM

## 2019-05-03 RX ADMIN — DIPHENHYDRAMINE HCL 25 MG: 25 TABLET ORAL at 22:15

## 2019-05-03 RX ADMIN — KETOROLAC TROMETHAMINE 15 MG: 30 INJECTION, SOLUTION INTRAMUSCULAR at 05:52

## 2019-05-03 RX ADMIN — ACETAMINOPHEN 650 MG: 325 TABLET ORAL at 12:07

## 2019-05-03 RX ADMIN — OXYCODONE HYDROCHLORIDE AND ACETAMINOPHEN 1 TABLET: 5; 325 TABLET ORAL at 23:55

## 2019-05-03 RX ADMIN — SODIUM CHLORIDE, SODIUM LACTATE, POTASSIUM CHLORIDE, AND CALCIUM CHLORIDE 125 ML/HR: .6; .31; .03; .02 INJECTION, SOLUTION INTRAVENOUS at 03:23

## 2019-05-03 RX ADMIN — ACETAMINOPHEN 650 MG: 325 TABLET ORAL at 05:53

## 2019-05-03 RX ADMIN — DOCUSATE SODIUM 100 MG: 100 CAPSULE, LIQUID FILLED ORAL at 18:00

## 2019-05-03 RX ADMIN — ACETAMINOPHEN 650 MG: 325 TABLET ORAL at 18:00

## 2019-05-03 RX ADMIN — KETOROLAC TROMETHAMINE 15 MG: 30 INJECTION, SOLUTION INTRAMUSCULAR at 11:45

## 2019-05-04 PROCEDURE — 99024 POSTOP FOLLOW-UP VISIT: CPT | Performed by: OBSTETRICS & GYNECOLOGY

## 2019-05-04 RX ORDER — ACETAMINOPHEN 325 MG/1
650 TABLET ORAL EVERY 6 HOURS PRN
Qty: 30 TABLET | Refills: 0 | Status: CANCELLED | OUTPATIENT
Start: 2019-05-04

## 2019-05-04 RX ORDER — OXYCODONE HYDROCHLORIDE AND ACETAMINOPHEN 5; 325 MG/1; MG/1
1 TABLET ORAL EVERY 6 HOURS PRN
Qty: 40 TABLET | Refills: 0 | Status: CANCELLED | OUTPATIENT
Start: 2019-05-04 | End: 2019-05-14

## 2019-05-04 RX ORDER — IBUPROFEN 600 MG/1
600 TABLET ORAL EVERY 6 HOURS PRN
Qty: 30 TABLET | Refills: 0 | Status: CANCELLED | OUTPATIENT
Start: 2019-05-04

## 2019-05-04 RX ORDER — DIAPER,BRIEF,INFANT-TODD,DISP
1 EACH MISCELLANEOUS 4 TIMES DAILY PRN
Qty: 30 G | Refills: 0 | Status: CANCELLED | OUTPATIENT
Start: 2019-05-04

## 2019-05-04 RX ADMIN — IBUPROFEN 600 MG: 600 TABLET ORAL at 12:39

## 2019-05-04 RX ADMIN — OXYCODONE HYDROCHLORIDE AND ACETAMINOPHEN 1 TABLET: 5; 325 TABLET ORAL at 11:22

## 2019-05-04 RX ADMIN — OXYCODONE HYDROCHLORIDE AND ACETAMINOPHEN 1 TABLET: 5; 325 TABLET ORAL at 06:14

## 2019-05-04 RX ADMIN — OXYCODONE HYDROCHLORIDE AND ACETAMINOPHEN 1 TABLET: 5; 325 TABLET ORAL at 21:57

## 2019-05-05 VITALS
HEIGHT: 64 IN | TEMPERATURE: 98.2 F | RESPIRATION RATE: 18 BRPM | OXYGEN SATURATION: 97 % | BODY MASS INDEX: 36.7 KG/M2 | WEIGHT: 215 LBS | SYSTOLIC BLOOD PRESSURE: 115 MMHG | DIASTOLIC BLOOD PRESSURE: 80 MMHG | HEART RATE: 69 BPM

## 2019-05-05 LAB
ABO GROUP BLD BPU: NORMAL
ABO GROUP BLD BPU: NORMAL
BPU ID: NORMAL
BPU ID: NORMAL
CROSSMATCH: NORMAL
CROSSMATCH: NORMAL
UNIT DISPENSE STATUS: NORMAL
UNIT DISPENSE STATUS: NORMAL
UNIT PRODUCT CODE: NORMAL
UNIT PRODUCT CODE: NORMAL
UNIT RH: NORMAL
UNIT RH: NORMAL

## 2019-05-05 PROCEDURE — 99024 POSTOP FOLLOW-UP VISIT: CPT | Performed by: OBSTETRICS & GYNECOLOGY

## 2019-05-05 PROCEDURE — NC001 PR NO CHARGE: Performed by: OBSTETRICS & GYNECOLOGY

## 2019-05-05 RX ORDER — ACETAMINOPHEN 325 MG/1
650 TABLET ORAL EVERY 6 HOURS PRN
Qty: 30 TABLET | Refills: 0 | Status: SHIPPED | OUTPATIENT
Start: 2019-05-05

## 2019-05-05 RX ORDER — CALCIUM CARBONATE 200(500)MG
1000 TABLET,CHEWABLE ORAL DAILY PRN
Qty: 20 TABLET | Refills: 0 | Status: CANCELLED | OUTPATIENT
Start: 2019-05-05

## 2019-05-05 RX ORDER — DIAPER,BRIEF,INFANT-TODD,DISP
1 EACH MISCELLANEOUS 4 TIMES DAILY PRN
Qty: 30 G | Refills: 0 | Status: CANCELLED | OUTPATIENT
Start: 2019-05-05

## 2019-05-05 RX ORDER — IBUPROFEN 600 MG/1
600 TABLET ORAL EVERY 6 HOURS PRN
Qty: 30 TABLET | Refills: 0 | Status: SHIPPED | OUTPATIENT
Start: 2019-05-05

## 2019-05-05 RX ORDER — SIMETHICONE 80 MG
80 TABLET,CHEWABLE ORAL EVERY 6 HOURS PRN
Qty: 30 TABLET | Refills: 0 | Status: CANCELLED | OUTPATIENT
Start: 2019-05-05

## 2019-05-05 RX ORDER — DIPHENHYDRAMINE HCL 25 MG
25 TABLET ORAL EVERY 6 HOURS PRN
Qty: 30 TABLET | Refills: 0 | Status: CANCELLED | OUTPATIENT
Start: 2019-05-05

## 2019-05-05 RX ORDER — OXYCODONE HYDROCHLORIDE AND ACETAMINOPHEN 5; 325 MG/1; MG/1
1 TABLET ORAL EVERY 4 HOURS PRN
Qty: 10 TABLET | Refills: 0 | Status: SHIPPED | OUTPATIENT
Start: 2019-05-05 | End: 2019-05-15

## 2019-05-05 RX ORDER — DOCUSATE SODIUM 100 MG/1
100 CAPSULE, LIQUID FILLED ORAL 2 TIMES DAILY
Qty: 10 CAPSULE | Refills: 0 | Status: SHIPPED | OUTPATIENT
Start: 2019-05-05

## 2019-05-05 RX ADMIN — OXYCODONE HYDROCHLORIDE AND ACETAMINOPHEN 1 TABLET: 5; 325 TABLET ORAL at 12:03

## 2019-05-05 RX ADMIN — OXYCODONE HYDROCHLORIDE AND ACETAMINOPHEN 1 TABLET: 5; 325 TABLET ORAL at 06:17

## 2019-05-05 RX ADMIN — PRENATAL VIT W/ FE FUMARATE-FA TAB 27-0.8 MG 1 TABLET: 27-0.8 TAB at 09:09

## 2019-05-05 RX ADMIN — DOCUSATE SODIUM 100 MG: 100 CAPSULE, LIQUID FILLED ORAL at 09:09

## 2019-05-06 ENCOUNTER — TRANSITIONAL CARE MANAGEMENT (OUTPATIENT)
Dept: FAMILY MEDICINE CLINIC | Facility: CLINIC | Age: 34
End: 2019-05-06

## 2019-05-07 ENCOUNTER — TELEPHONE (OUTPATIENT)
Dept: POSTPARTUM | Facility: CLINIC | Age: 34
End: 2019-05-07

## 2019-05-10 ENCOUNTER — TELEPHONE (OUTPATIENT)
Dept: FAMILY MEDICINE CLINIC | Facility: CLINIC | Age: 34
End: 2019-05-10

## 2019-05-10 LAB — PLACENTA IN STORAGE: NORMAL

## 2020-03-31 ENCOUNTER — HOSPITAL ENCOUNTER (EMERGENCY)
Facility: HOSPITAL | Age: 35
Discharge: HOME | End: 2020-03-31
Attending: EMERGENCY MEDICINE | Admitting: EMERGENCY MEDICINE
Payer: COMMERCIAL

## 2020-03-31 VITALS
BODY MASS INDEX: 25.71 KG/M2 | WEIGHT: 160 LBS | OXYGEN SATURATION: 98 % | HEIGHT: 66 IN | DIASTOLIC BLOOD PRESSURE: 80 MMHG | TEMPERATURE: 98.3 F | SYSTOLIC BLOOD PRESSURE: 123 MMHG | RESPIRATION RATE: 18 BRPM | HEART RATE: 77 BPM

## 2020-03-31 DIAGNOSIS — R51.9 ACUTE HEADACHE: Primary | ICD-10-CM

## 2020-03-31 PROCEDURE — 99284 EMERGENCY DEPT VISIT MOD MDM: CPT | Performed by: EMERGENCY MEDICINE

## 2020-03-31 PROCEDURE — 96372 THER/PROPH/DIAG INJ SC/IM: CPT

## 2020-03-31 PROCEDURE — 99283 EMERGENCY DEPT VISIT LOW MDM: CPT

## 2020-03-31 RX ORDER — KETOROLAC TROMETHAMINE 30 MG/ML
15 INJECTION, SOLUTION INTRAMUSCULAR; INTRAVENOUS ONCE
Status: COMPLETED | OUTPATIENT
Start: 2020-03-31 | End: 2020-03-31

## 2020-03-31 RX ORDER — OLANZAPINE 5 MG/1
5 TABLET, ORALLY DISINTEGRATING ORAL ONCE
Status: COMPLETED | OUTPATIENT
Start: 2020-03-31 | End: 2020-03-31

## 2020-03-31 RX ORDER — NAPROXEN 500 MG/1
500 TABLET ORAL 2 TIMES DAILY PRN
Qty: 30 TABLET | Refills: 0 | Status: SHIPPED | OUTPATIENT
Start: 2020-03-31 | End: 2022-04-01

## 2020-03-31 RX ORDER — ACETAMINOPHEN 500 MG
1000 TABLET ORAL EVERY 6 HOURS PRN
Qty: 30 TABLET | Refills: 0 | Status: SHIPPED | OUTPATIENT
Start: 2020-03-31

## 2020-03-31 RX ADMIN — KETOROLAC TROMETHAMINE 15 MG: 30 INJECTION, SOLUTION INTRAMUSCULAR at 19:42

## 2020-03-31 RX ADMIN — OLANZAPINE 5 MG: 5 TABLET, ORALLY DISINTEGRATING ORAL at 19:42

## 2020-11-16 NOTE — PROGRESS NOTES
Caleb Salinas Patient Age: 84 year old  MESSAGE: Interpreting service used: No      IM/FP- FYI to Provider-     Additional Information: per caller patient agreed to go to ER and is on their was to [facility]. See telephone message 11/16    Is patient currently having symptoms?  No- Does caller require a call back? No -Route message to provider's clinical support pool.       WEIGHT AND HEIGHT:   Wt Readings from Last 1 Encounters:   07/20/20 81.2 kg (179 lb)     Ht Readings from Last 1 Encounters:   07/20/20 5' 5\" (1.651 m)     BMI Readings from Last 1 Encounters:   07/20/20 29.79 kg/m²       ALLERGIES:  Lisinopril, Nitroglycerin, and Valsartan  Current Outpatient Medications   Medication   • atorvastatin (LIPITOR) 20 MG tablet   • flutamide (EULEXIN) 125 MG capsule   • dilTIAZem (CARDIZEM CD) 240 MG 24 hr capsule   • metFORMIN (GLUCOPHAGE-XR) 500 MG 24 hr tablet   • dilTIAZem (TIAZAC) 120 MG 24 hr capsule   • tamsulosin (FLOMAX) 0.4 MG Cap   • losartan (COZAAR) 50 MG tablet   • ELIQUIS 2.5 MG Tab   • bumetanide (BUMEX) 1 MG tablet   • ammonium lactate (AMLACTIN) 12 % lotion   • aspirin 81 MG chewable tablet   • baclofen (LIORESAL) 10 MG tablet   • calcium carbonate (OS-SANDEE) 1250 (500 Ca) MG tablet   • Cholecalciferol (VITAMIN D3) 5773598 UNIT/GM Liquid   • ferrous gluconate (FERGON) 324 (38 Fe) MG tablet   • blood glucose (ACCU-CHEK RACHNA PLUS) test strip   • Multiple Vitamins-Minerals (CENTRUM SILVER ULTRA MENS) Tab     No current facility-administered medications for this visit.      PHARMACY to use: Ask patient.            Pharmacy preference(s) on file:   [facility] - [place], [place] - [address]  [address] [address]  Phone: [phone] Fax: [phone]    [id number] [place], [place] - [address] [place] [address] & [address] [address]  Phone: [phone] Fax: [phone]      CALL BACK INFO: Ok to leave  OB/GYN  PN Visit  Angel Fall  [id number]  3/6/2019  3:25 PM  Dr Monie Mena MD    Estimated Date of Delivery: 19    S: 29 y o  [id number] 30w5d here for PN visit  OB complaints:  Ctxns:  denies  Leakage: denies  Bleeding: denies  Fetal movement: frequent,  However does not consistently monitor    Concerns:  "When will my  be scheduled?"  O:  Vitals:    19 1334   BP: 120/80       Gen: no acute distress, nonlabored breathing  OB exam completed: fundal height: 30cm, FHT: 138 bpm; reassuring     A/P:  #1  30w5d GESTATION  Labor precautions reviewed  Fetal kick counts reviewed,  hand out provided  RTC in 2 weeks    #2  Patient has previously consented to tubal ligation on 2019  Patient confirms that she would still like tubal at the time of her   #3   Reviewed lab work ordered,  Completed on 02/15/2019    -  1 hour Glucola: 126, wnl   -  Hgb: 10 5   -  RPR wnl   -  Type and screen: O+    -  Patient does not have any questions regarding ultrasound completed 02/15/2019    #4  Tdap provided  Today  Flu vaccine provided 18    #5  Denies SI/HI  Feels safe at home,  Denies domestic abuse  Partner is usually with patient at every visit  Not here today because he was unable to get out of work  PHQ-9 Depression Screening    PHQ-9:    Frequency of the following problems over the past two weeks:       Little interest or pleasure in doing things:  0 - not at all  Feeling down, depressed, or hopeless:  0 - not at all  PHQ-2 Score:  0          #6  Patient advised that she will have to follow up with up [facility] [employer],[address],  Prior to ,   will most likely be scheduled after that visit       Future Appointments   Date Time Provider Tram Murphy   3/20/2019  3:30 PM Monie Mena MD [facility]   3/29/2019 10:00 AM  [address],            Monie Mena MD  3/6/2019  3:25 PM response (including medical information) on answering machine  ROUTING: Patient's physician/staff        PCP: Chucho Burgess MD         INS: Payor: [employer] MEDICARE / Plan: [id number] / Product Type: HMO   PATIENT ADDRESS:  [address] [place], [place] [id number]

## 2021-09-14 ENCOUNTER — OFFICE VISIT (OUTPATIENT)
Dept: FAMILY MEDICINE CLINIC | Facility: CLINIC | Age: 36
End: 2021-09-14

## 2021-09-14 VITALS
TEMPERATURE: 98.3 F | DIASTOLIC BLOOD PRESSURE: 80 MMHG | WEIGHT: 171.6 LBS | BODY MASS INDEX: 27.58 KG/M2 | RESPIRATION RATE: 18 BRPM | HEART RATE: 74 BPM | OXYGEN SATURATION: 99 % | HEIGHT: 66 IN | SYSTOLIC BLOOD PRESSURE: 120 MMHG

## 2021-09-14 DIAGNOSIS — M41.9 SCOLIOSIS OF THORACOLUMBAR SPINE, UNSPECIFIED SCOLIOSIS TYPE: Primary | ICD-10-CM

## 2021-09-14 PROCEDURE — 99213 OFFICE O/P EST LOW 20 MIN: CPT | Performed by: FAMILY MEDICINE

## 2021-09-14 RX ORDER — TOPIRAMATE 25 MG/1
TABLET ORAL
COMMUNITY
Start: 2021-09-12

## 2021-09-14 NOTE — PROGRESS NOTES
Assessment/Plan:    Scoliosis of thoracolumbar spine  Presented with back pain   on examination, spinal deformity noted with lateral curvature of the  Thoracolumbar spine   never diagnosed with scoliosis   referral given for Orthopedics   patient can continue taking Tylenol as needed for the back pain   will follow-up with the recommendations        Diagnoses and all orders for this visit:    Scoliosis of thoracolumbar spine, unspecified scoliosis type  -     Ambulatory referral to Orthopedic Surgery; Future            Subjective:      Patient ID: Kendall Reyes is a 29 y o  female  67-year-old female is here with presenting complaint of back pain  Patient reports back pain since 3-4 weeks  As per patient "  Swelling"  Was noted on the  Right side of the spine by her boyfriend  On examination patient was noted to have lateral curvature of thoracolumbar spine  Patient was never diagnosed with scoliosis  Patient denies any numbness or tingling of the extremities  No other complaints      The following portions of the patient's history were reviewed and updated as appropriate:   She  has a past medical history of Depression (2016), Obesity affecting pregnancy, antepartum (3/29/2019), Postpartum depression, Scoliosis of thoracolumbar spine (2021), and Varicella    She   Patient Active Problem List    Diagnosis Date Noted    Scoliosis of thoracolumbar spine 2021    39 weeks gestation of pregnancy 2019    Previous  section complicating pregnancy     S/P repeat low transverse  2019    Status post bilateral salpingectomy 2019    Request for sterilization 2019    Obesity affecting pregnancy, antepartum 2019    36 weeks gestation of pregnancy 2018    Pterygium of both eyes 2017     She  has a past surgical history that includes  section; pr  delivery only (N/A, 2019); and Tubal ligation (Bilateral, 5/2/2019)  Her family history includes Diabetes in her brother and father; Hypertension in her maternal uncle  She  reports that she has been smoking cigarettes  She has been smoking about 0 50 packs per day  She has never used smokeless tobacco  She reports previous alcohol use  She reports that she does not use drugs  Current Outpatient Medications   Medication Sig Dispense Refill    acetaminophen (TYLENOL) 325 mg tablet Take 2 tablets (650 mg total) by mouth every 6 (six) hours as needed for mild pain, headaches or fever (Patient not taking: Reported on 9/14/2021) 30 tablet 0    acetaminophen (TYLENOL) 500 mg tablet Take 2 tablets (1,000 mg total) by mouth every 6 (six) hours as needed for mild pain or headaches (Patient not taking: Reported on 9/14/2021) 30 tablet 0    docusate sodium (COLACE) 100 mg capsule Take 1 capsule (100 mg total) by mouth 2 (two) times a day (Patient not taking: Reported on 9/14/2021) 10 capsule 0    ibuprofen (MOTRIN) 600 mg tablet Take 1 tablet (600 mg total) by mouth every 6 (six) hours as needed for moderate pain (Patient not taking: Reported on 9/14/2021) 30 tablet 0    naproxen (NAPROSYN) 500 mg tablet Take 1 tablet (500 mg total) by mouth 2 (two) times a day as needed for mild pain (Patient not taking: Reported on 9/14/2021) 30 tablet 0    Prenatal Vit-Fe Fumarate-FA (PRENATAL VITAMIN) 27-0 8 MG TABS Take 1 tablet by mouth daily (Patient not taking: Reported on 9/14/2021) 90 tablet 3    topiramate (TOPAMAX) 25 mg tablet  (Patient not taking: Reported on 9/14/2021)       No current facility-administered medications for this visit       Current Outpatient Medications on File Prior to Visit   Medication Sig    acetaminophen (TYLENOL) 325 mg tablet Take 2 tablets (650 mg total) by mouth every 6 (six) hours as needed for mild pain, headaches or fever (Patient not taking: Reported on 9/14/2021)    acetaminophen (TYLENOL) 500 mg tablet Take 2 tablets (1,000 mg total) by mouth every 6 (six) hours as needed for mild pain or headaches (Patient not taking: Reported on 9/14/2021)    docusate sodium (COLACE) 100 mg capsule Take 1 capsule (100 mg total) by mouth 2 (two) times a day (Patient not taking: Reported on 9/14/2021)    ibuprofen (MOTRIN) 600 mg tablet Take 1 tablet (600 mg total) by mouth every 6 (six) hours as needed for moderate pain (Patient not taking: Reported on 9/14/2021)    naproxen (NAPROSYN) 500 mg tablet Take 1 tablet (500 mg total) by mouth 2 (two) times a day as needed for mild pain (Patient not taking: Reported on 9/14/2021)    Prenatal Vit-Fe Fumarate-FA (PRENATAL VITAMIN) 27-0 8 MG TABS Take 1 tablet by mouth daily (Patient not taking: Reported on 9/14/2021)    topiramate (TOPAMAX) 25 mg tablet  (Patient not taking: Reported on 9/14/2021)     No current facility-administered medications on file prior to visit  She has No Known Allergies       Review of Systems   Constitutional: Negative for chills and fever  HENT: Negative for ear pain and sore throat  Eyes: Negative for pain and visual disturbance  Respiratory: Negative for cough and shortness of breath  Cardiovascular: Negative for chest pain and palpitations  Gastrointestinal: Negative for abdominal pain and vomiting  Genitourinary: Negative for dysuria and hematuria  Musculoskeletal: Positive for back pain  Negative for arthralgias  Skin: Negative for color change and rash  Neurological: Negative for seizures and syncope  Psychiatric/Behavioral: Negative for agitation and behavioral problems  All other systems reviewed and are negative  Objective:      /80 (BP Location: Left arm, Patient Position: Sitting, Cuff Size: Standard)   Pulse 74   Temp 98 3 °F (36 8 °C) (Tympanic)   Resp 18   Ht 5' 6" (1 676 m)   Wt 77 8 kg (171 lb 9 6 oz)   LMP 08/15/2021 (Approximate)   SpO2 99%   BMI 27 70 kg/m²          Physical Exam  Vitals reviewed     Constitutional: Appearance: Normal appearance  HENT:      Head: Normocephalic and atraumatic  Mouth/Throat:      Mouth: Mucous membranes are moist    Eyes:      Conjunctiva/sclera: Conjunctivae normal    Cardiovascular:      Rate and Rhythm: Normal rate and regular rhythm  Pulses: Normal pulses  Heart sounds: Normal heart sounds  Pulmonary:      Effort: Pulmonary effort is normal       Breath sounds: Normal breath sounds  Abdominal:      General: Abdomen is flat  Bowel sounds are normal  There is no distension  Palpations: Abdomen is soft  Musculoskeletal:         General: Deformity ( later curvature of thoracolumbar spine noted) present  Normal range of motion  Cervical back: Normal range of motion  Right lower leg: No edema  Left lower leg: No edema  Neurological:      Mental Status: She is alert  Motor: No weakness        Gait: Gait normal    Psychiatric:         Mood and Affect: Mood normal          Behavior: Behavior normal

## 2021-09-14 NOTE — ASSESSMENT & PLAN NOTE
Presented with back pain   on examination, spinal deformity noted with lateral curvature of the  Thoracolumbar spine   never diagnosed with scoliosis   referral given for Orthopedics   patient can continue taking Tylenol as needed for the back pain   will follow-up with the recommendations

## 2021-09-29 NOTE — PROGRESS NOTES
1  Scoliosis of thoracolumbar spine, unspecified scoliosis type  Ambulatory referral to Orthopedic Surgery    XR entire spine (scoliosis) 2-3 vw    Ambulatory referral to Physical Therapy     Orders Placed This Encounter   Procedures    XR entire spine (scoliosis) 2-3 vw    Ambulatory referral to Physical Therapy        Imaging Studies (I personally reviewed images in PACS and report):    X-ray of entire spine ( scoliosis 2-3 view) 09/30/2021:  Thoracolumbar dextroscoliosis  IMPRESSION:  Thoracolumbar dextroscoliosis  back pain    Plan  - Advised her this is likely from childhood  -  Physical therapy for core strengthening  If pain persist will get MRI  For further evaluation  Repeat X-ray next visit: None    Return in about 6 weeks (around 11/11/2021)  Patient Instructions   Back pain can often be caused by a spectrum of issues ranging from back strain which is a muscle tear in the back and heals with time to spasm associated with chronic back arthritis (degenerative disc disease) to Sciatica which is radiation of pain down the leg with or without numbness and tingling that is caused by a pinch of a nerve in the spine from bony arthritis or herniated disc or pinching of a nerve due to buttock muscle spasm known as piriformis syndrome  Mainstay of treatment is physical therapy coupled with pharmacologic management of pain  Bed rest is no longer recommended as it results in stiffness and delayed recovery  Physical therapy to toleration at first is recommended and proceeded by progression to strengthening  Drugs often used to treat back pain are anti-inflammatories (NSAIDs), Tylenol (acetaminophen), muscle relaxers  In recent years, study have shown that Tylenol does not offer much relief however it is used along with nsaids for increased relief especially when patients cannot take other medicines such as muscle relaxers    Occassionally, for refractory pain, steroid packs such as Medrol are used but studies show that these medications do not offer long term relief  Opiods/pain killers (Percocet, oxycodone, hydrocodone) carry a significant addictive and dependence risk and as such are used only in cases of severe pain and per the CDC limited to a few days of use to avoid dependence  Mris are reserved for patients with red flags including fevers, chills, unintentional weight loss, dropped foot or severe weakness, bowel or bladder incontinence which are signs of medical emergencies including cancer, infection, severely pinched nerve, and a medical complication known as cauda equina syndrome  If you ever have any of these symptoms then please notify physician immediately and present to the ER  If you continue to have symptoms then we will pursue MRI of your back after failing 4 weeks of physical therapy  Unless there are red flags as above, most insurances do not cover MRIs to be performed until after a trial of physical therapy  This is because most back pain resolves within 1-3 months and rarely requires invasive intervention  If back pain fails to improve with conservative measures (therapy, medicine, time) then I will order MRI and if appropriate refer to pain management to consider injections and other invasive intervention  Surgery is rarely warranted early on in back pain unless there is a significant red flag  CHIEF COMPLAINT: evaluation of  Back pain      HPI:  Glenis Lozano is a 29 y o  female  who presents for evaluation of back pain  She was referred by Dr Lew Pulliam for scoliosis evaluation  Visit 9/30/2021 :    Patient reports back pain for about 3-4 weeks  Her boyfriend noted his swelling on the right side of her back and she was evaluated by her PCP  There was concern for possible scoliosis  She has no history of diagnosed scoliosis  States she does have some mild pain which is constant on her upper back    She describes the pain with pressure and some stabbing intermittently  Pain is worse with prolonged sitting, laying on her back and prolonged standing  Review of Systems   Constitutional: Negative for chills and fever  HENT: Negative for ear pain and sore throat  Eyes: Negative for pain and visual disturbance  Respiratory: Negative for cough and shortness of breath  Cardiovascular: Negative for chest pain and palpitations  Gastrointestinal: Negative for abdominal pain and vomiting  Genitourinary: Negative for dysuria and hematuria  Musculoskeletal: Negative for arthralgias and back pain  Skin: Negative for color change and rash  Neurological: Negative for seizures and syncope  All other systems reviewed and are negative          Following history reviewed and update:    Past Medical History:   Diagnosis Date    Depression 2016    18 wk loss of gestation, no rx no SI/HI    Obesity affecting pregnancy, antepartum 3/29/2019    Postpartum depression     18 wk loss of gestation, no rx no SI/HI    Scoliosis of thoracolumbar spine 2021    Varicella     vac     Past Surgical History:   Procedure Laterality Date     SECTION      [place]  DELIVERY ONLY N/A 2019    Procedure:  SECTION () REPEAT;  Surgeon: Jesus Manuel Bailey MD;  [facility]: [facility] ;  Service: Obstetrics    TUBAL LIGATION Bilateral 2019    Procedure: LIGATION/COAGULATION TUBAL;  Surgeon: Jesus Manuel Bailey MD;  Location: [facility] ;  Service: Obstetrics     Social History   Social History     Substance and Sexual Activity   Alcohol Use Not Currently    Comment: Social     Social History     Substance and Sexual Activity   Drug Use No     Social History     Tobacco Use   Smoking Status Heavy Tobacco Smoker    Packs/day: 0 50    Types: Cigarettes   Smokeless Tobacco Never Used   Tobacco Comment    quit with preg     Family History   Problem Relation Age of Onset    Diabetes Father         type 2    Diabetes Brother         type 2    Hypertension Maternal Uncle      No Known Allergies       Physical Exam  /77 (BP Location: Left arm, Patient Position: Sitting, Cuff Size: Adult)   Pulse 81   Ht 5' 6" (1 676 m)   Wt 75 8 kg (167 lb)   BMI 26 95 kg/m²     Constitutional:  see vital signs  Gen: well-developed, normocephalic/atraumatic, well-groomed  Eyes: No inflammation or discharge of conjunctiva or lids; sclera clear   Pharynx: no inflammation, lesion, or mass of lips  Neck: supple, no masses, non-distended  MSK: no inflammation, lesion, mass, or clubbing of nails and digits except for other than mentioned below  SKIN: no visible rashes or skin lesions  Pulmonary/Chest: Effort normal  No respiratory distress     NEURO: cranial nerves grossly intact  PSYCH:  Alert and oriented to person, place, and time; recent and remote memory intact; mood normal, no depression, anxiety, or agitation, judgment and insight good and intact     Ortho Exam    BACK EXAM:  Gait: normal, no trendelenberg gait, no antalgic gait    BACK TENDERNESS:  Spinous Processes: no  Paraspinal Muscles: no  SI Joint: no  Sacrum: no    ROM:  Flexion: intact  Extension: intact  Sidebending: intact  Forward flexion test: right-sided thoracolumbar hump    DERMATOMAL SENSATION:  L1: normal   L2: normal   L3: normal   L4: normal   L5: normal   S1: normal    STRENGTH (bilateral):  Knee Extension: 5/5  Knee Flexion: 5/5  Foot Dorsiflexion: 5/5  Great Toe Extension: 5/5  Foot Plantarflexion: 5/5  Hip Flexion: 5/5  Hip Abduction: 5/5    REFLEXES:  Patellar: 2+ bilateral  Achilles: 2+ bilateral  Clonus: negative bilateral    BACK:   SUPINE STRAIGHT LEG: negative  PRONE STRAIGHT LEG:  SLUMP: negative    RIGHT HIP:  LOG ROLL: negative  EZIO: negative  FADIR: negative    LEFT HIP:  LOG ROLL: negative  EZIO: negative  FADIR: negative    SI JOINT:  ASIS COMPRESSION TEST:   GAENSLIN'S TEST:   STORK TEST:   FORTON'S FINGER:   EZIO SI PAIN:     Cervical  ROM: intact  Midline spinous process tenderness: None  Muscular Tenderness: None  Sensation UE Bilateral:  C5: normal  C6: normal  C7: normal  C8: normal  T1: normal  Strength UE: 5/5 elbow, wrist, fingers bilateral  Reflexes:   Spurlings:        Procedures

## 2021-09-30 ENCOUNTER — APPOINTMENT (OUTPATIENT)
Dept: RADIOLOGY | Facility: OTHER | Age: 36
End: 2021-09-30
Payer: COMMERCIAL

## 2021-09-30 ENCOUNTER — OFFICE VISIT (OUTPATIENT)
Dept: OBGYN CLINIC | Facility: OTHER | Age: 36
End: 2021-09-30
Payer: COMMERCIAL

## 2021-09-30 VITALS
WEIGHT: 167 LBS | HEIGHT: 66 IN | BODY MASS INDEX: 26.84 KG/M2 | HEART RATE: 81 BPM | SYSTOLIC BLOOD PRESSURE: 110 MMHG | DIASTOLIC BLOOD PRESSURE: 77 MMHG

## 2021-09-30 DIAGNOSIS — M41.9 SCOLIOSIS OF THORACOLUMBAR SPINE, UNSPECIFIED SCOLIOSIS TYPE: ICD-10-CM

## 2021-09-30 PROCEDURE — 72082 X-RAY EXAM ENTIRE SPI 2/3 VW: CPT

## 2021-09-30 PROCEDURE — 99243 OFF/OP CNSLTJ NEW/EST LOW 30: CPT | Performed by: FAMILY MEDICINE

## 2021-09-30 NOTE — LETTER
October 1, 2021     Matilda Martinez MD  [facility] [address]    Patient: Kendall Reyes   YOB: 1985   Date of Visit: 9/30/2021       Dear Dr Rondon Fix: Thank you for referring Kendall Reyes to me for evaluation  Below are my notes for this consultation  If you have questions, please do not hesitate to call me  I look forward to following your patient along with you  Sincerely,        Adrienne Erickson III, DO        CC: No Recipients  Maryetta Fleischer, DO  9/30/2021  8:07 PM  Cosign Needed  1  Scoliosis of thoracolumbar spine, unspecified scoliosis type  Ambulatory referral to Orthopedic Surgery    XR entire spine (scoliosis) 2-3 vw    Ambulatory referral to Physical Therapy     Orders Placed This Encounter   Procedures    XR entire spine (scoliosis) 2-3 vw    Ambulatory referral to Physical Therapy        Imaging Studies (I personally reviewed images in PACS and report):      IMPRESSION:  Thoracolumbar dextroscoliosis  back pain      If pain persist will get MRI    Repeat X-ray next visit: None    Return in about 6 weeks (around 11/11/2021)  Patient Instructions   Back pain can often be caused by a spectrum of issues ranging from back strain which is a muscle tear in the back and heals with time to spasm associated with chronic back arthritis (degenerative disc disease) to Sciatica which is radiation of pain down the leg with or without numbness and tingling that is caused by a pinch of a nerve in the spine from bony arthritis or herniated disc or pinching of a nerve due to buttock muscle spasm known as piriformis syndrome  Mainstay of treatment is physical therapy coupled with pharmacologic management of pain  Bed rest is no longer recommended as it results in stiffness and delayed recovery  Physical therapy to toleration at first is recommended and proceeded by progression to strengthening       Drugs often used to treat back pain are anti-inflammatories (NSAIDs), Tylenol (acetaminophen), muscle relaxers  In recent years, study have shown that Tylenol does not offer much relief however it is used along with nsaids for increased relief especially when patients cannot take other medicines such as muscle relaxers  Occassionally, for refractory pain, steroid packs such as Medrol are used but studies show that these medications do not offer long term relief  Opiods/pain killers (Percocet, oxycodone, hydrocodone) carry a significant addictive and dependence risk and as such are used only in cases of severe pain and per the CDC limited to a few days of use to avoid dependence  Mris are reserved for patients with red flags including fevers, chills, unintentional weight loss, dropped foot or severe weakness, bowel or bladder incontinence which are signs of medical emergencies including cancer, infection, severely pinched nerve, and a medical complication known as cauda equina syndrome  If you ever have any of these symptoms then please notify physician immediately and present to the ER  If you continue to have symptoms then we will pursue MRI of your back after failing 4 weeks of physical therapy  Unless there are red flags as above, most insurances do not cover MRIs to be performed until after a trial of physical therapy  This is because most back pain resolves within 1-3 months and rarely requires invasive intervention  If back pain fails to improve with conservative measures (therapy, medicine, time) then I will order MRI and if appropriate refer to pain management to consider injections and other invasive intervention  Surgery is rarely warranted early on in back pain unless there is a significant red flag  CHIEF COMPLAINT: evaluation of  Back pain      HPI:  Coery Tovar is a 29 y o  female  who presents for evaluation of back pain  She was referred by Dr Gilford Blower for scoliosis evaluation        Visit 9/30/2021 :    Patient reports back pain for about 3-4 weeks  Her boyfriend noted his swelling on the right side of her back and she was evaluated by her PCP  There was concern for possible scoliosis  She has no history of diagnosed scoliosis  States she does have some mild pain which is constant on her upper back  She describes the pain with pressure and some stabbing intermittently  Pain is worse with prolonged sitting and prolonged standing  Review of Systems   Constitutional: Negative for chills and fever  HENT: Negative for ear pain and sore throat  Eyes: Negative for pain and visual disturbance  Respiratory: Negative for cough and shortness of breath  Cardiovascular: Negative for chest pain and palpitations  Gastrointestinal: Negative for abdominal pain and vomiting  Genitourinary: Negative for dysuria and hematuria  Musculoskeletal: Negative for arthralgias and back pain  Skin: Negative for color change and rash  Neurological: Negative for seizures and syncope  All other systems reviewed and are negative          Following history reviewed and update:    Past Medical History:   Diagnosis Date    Depression 2016    18 wk loss of gestation, no rx no SI/HI    Obesity affecting pregnancy, antepartum 3/29/2019    Postpartum depression     18 wk loss of gestation, no rx no SI/HI    Scoliosis of thoracolumbar spine 2021    Varicella     vac     Past Surgical History:   Procedure Laterality Date     SECTION      MO  DELIVERY ONLY N/A 2019    Procedure:  SECTION () REPEAT;  Surgeon: Saul Kramer MD;  [facility] ;  Service: Obstetrics    TUBAL LIGATION Bilateral 2019    Procedure: LIGATION/COAGULATION TUBAL;  Surgeon: Saul Kramer MD;  [facility] ;  Service: Obstetrics     Social History   Social History     Substance and Sexual Activity   Alcohol Use Not Currently    Comment: Social     Social History     Substance and Sexual Activity   Drug Use No     Social History     Tobacco Use   Smoking Status Heavy Tobacco Smoker    Packs/day: 0 50    Types: Cigarettes   Smokeless Tobacco Never Used   Tobacco Comment    quit with preg     Family History   Problem Relation Age of Onset    Diabetes Father         type 2    Diabetes Brother         type 2    Hypertension Maternal Uncle      No Known Allergies       Physical Exam  /77 (BP Location: Left arm, Patient Position: Sitting, Cuff Size: Adult)   Pulse 81   Ht 5' 6" (1 676 m)   Wt 75 8 kg (167 lb)   BMI 26 95 kg/m²     Constitutional:  see vital signs  Gen: well-developed, normocephalic/atraumatic, well-groomed  Eyes: No inflammation or discharge of conjunctiva or lids; sclera clear   Pharynx: no inflammation, lesion, or mass of lips  Neck: supple, no masses, non-distended  MSK: no inflammation, lesion, mass, or clubbing of nails and digits except for other than mentioned below  SKIN: no visible rashes or skin lesions  Pulmonary/Chest: Effort normal  No respiratory distress     NEURO: cranial nerves grossly intact  PSYCH:  Alert and oriented to person, place, and time; recent and remote memory intact; mood normal, no depression, anxiety, or agitation, judgment and insight good and intact     Ortho Exam    BACK EXAM:  Gait: normal, no trendelenberg gait, no antalgic gait    BACK TENDERNESS:  Spinous Processes: no  Paraspinal Muscles: no  SI Joint: no  Sacrum: no    ROM:  Flexion: intact  Extension: intact  Sidebending: intact  Forward flexion test: right-sided thoracolumbar hump    DERMATOMAL SENSATION:  L1: normal   L2: normal   L3: normal   L4: normal   L5: normal   S1: normal    STRENGTH (bilateral):  Knee Extension: 5/5  Knee Flexion: 5/5  Foot Dorsiflexion: 5/5  Great Toe Extension: 5/5  Foot Plantarflexion: 5/5  Hip Flexion: 5/5  Hip Abduction: 5/5    REFLEXES:  Patellar: 2+ bilateral  Achilles: 2+ bilateral  Clonus: negative bilateral    BACK:   SUPINE STRAIGHT LEG: negative  PRONE STRAIGHT LEG:  SLUMP: negative    RIGHT HIP:  LOG ROLL: negative  EZIO: negative  FADIR: negative    LEFT HIP:  LOG ROLL: negative  EZIO: negative  FADIR: negative    SI JOINT:  ASIS COMPRESSION TEST:   GAENSLIN'S TEST:   STORK TEST:   ALY'S FINGER:   EZIO SI PAIN:     Cervical  ROM: intact  Midline spinous process tenderness: None  Muscular Tenderness: None  Sensation UE Bilateral:  C5: normal  C6: normal  C7: normal  C8: normal  T1: normal  Strength UE: 5/5 elbow, wrist, fingers bilateral  Reflexes:   Spurlings:        Procedures

## 2021-10-14 ENCOUNTER — EVALUATION (OUTPATIENT)
Dept: PHYSICAL THERAPY | Facility: OTHER | Age: 36
End: 2021-10-14
Payer: COMMERCIAL

## 2021-10-14 DIAGNOSIS — M41.9 SCOLIOSIS OF THORACOLUMBAR SPINE, UNSPECIFIED SCOLIOSIS TYPE: ICD-10-CM

## 2021-10-14 PROCEDURE — 97161 PT EVAL LOW COMPLEX 20 MIN: CPT | Performed by: PHYSICAL THERAPIST

## 2021-10-14 PROCEDURE — 97110 THERAPEUTIC EXERCISES: CPT | Performed by: PHYSICAL THERAPIST

## 2021-10-21 ENCOUNTER — OFFICE VISIT (OUTPATIENT)
Dept: PHYSICAL THERAPY | Facility: OTHER | Age: 36
End: 2021-10-21
Payer: COMMERCIAL

## 2021-10-21 DIAGNOSIS — M41.9 SCOLIOSIS OF THORACOLUMBAR SPINE, UNSPECIFIED SCOLIOSIS TYPE: Primary | ICD-10-CM

## 2021-10-21 PROCEDURE — 97110 THERAPEUTIC EXERCISES: CPT | Performed by: PHYSICAL THERAPIST

## 2021-10-21 PROCEDURE — 97112 NEUROMUSCULAR REEDUCATION: CPT | Performed by: PHYSICAL THERAPIST

## 2021-10-28 ENCOUNTER — OFFICE VISIT (OUTPATIENT)
Dept: PHYSICAL THERAPY | Facility: OTHER | Age: 36
End: 2021-10-28
Payer: COMMERCIAL

## 2021-10-28 DIAGNOSIS — M41.9 SCOLIOSIS OF THORACOLUMBAR SPINE, UNSPECIFIED SCOLIOSIS TYPE: Primary | ICD-10-CM

## 2021-10-28 PROCEDURE — 97110 THERAPEUTIC EXERCISES: CPT

## 2021-10-28 PROCEDURE — 97112 NEUROMUSCULAR REEDUCATION: CPT

## 2021-11-04 ENCOUNTER — OFFICE VISIT (OUTPATIENT)
Dept: PHYSICAL THERAPY | Facility: OTHER | Age: 36
End: 2021-11-04
Payer: COMMERCIAL

## 2021-11-04 DIAGNOSIS — M41.9 SCOLIOSIS OF THORACOLUMBAR SPINE, UNSPECIFIED SCOLIOSIS TYPE: Primary | ICD-10-CM

## 2021-11-04 PROCEDURE — 97112 NEUROMUSCULAR REEDUCATION: CPT | Performed by: PHYSICAL THERAPIST

## 2021-11-04 PROCEDURE — 97110 THERAPEUTIC EXERCISES: CPT | Performed by: PHYSICAL THERAPIST

## 2021-12-09 ENCOUNTER — OFFICE VISIT (OUTPATIENT)
Dept: OBGYN CLINIC | Facility: OTHER | Age: 36
End: 2021-12-09
Payer: COMMERCIAL

## 2021-12-09 VITALS
WEIGHT: 164 LBS | SYSTOLIC BLOOD PRESSURE: 110 MMHG | BODY MASS INDEX: 26.36 KG/M2 | HEART RATE: 83 BPM | HEIGHT: 66 IN | DIASTOLIC BLOOD PRESSURE: 74 MMHG

## 2021-12-09 DIAGNOSIS — G89.29 CHRONIC BACK PAIN, UNSPECIFIED BACK LOCATION, UNSPECIFIED BACK PAIN LATERALITY: Primary | ICD-10-CM

## 2021-12-09 DIAGNOSIS — M54.9 CHRONIC BACK PAIN, UNSPECIFIED BACK LOCATION, UNSPECIFIED BACK PAIN LATERALITY: Primary | ICD-10-CM

## 2021-12-09 DIAGNOSIS — M41.9 SCOLIOSIS OF THORACOLUMBAR SPINE, UNSPECIFIED SCOLIOSIS TYPE: ICD-10-CM

## 2021-12-09 PROCEDURE — 99214 OFFICE O/P EST MOD 30 MIN: CPT | Performed by: FAMILY MEDICINE

## 2022-01-15 ENCOUNTER — HOSPITAL ENCOUNTER (OUTPATIENT)
Dept: RADIOLOGY | Facility: HOSPITAL | Age: 37
Discharge: HOME | End: 2022-01-15
Attending: FAMILY MEDICINE
Payer: COMMERCIAL

## 2022-01-15 DIAGNOSIS — M41.9 SCOLIOSIS OF THORACOLUMBAR SPINE, UNSPECIFIED SCOLIOSIS TYPE: ICD-10-CM

## 2022-01-15 DIAGNOSIS — G89.29 CHRONIC BACK PAIN, UNSPECIFIED BACK LOCATION, UNSPECIFIED BACK PAIN LATERALITY: ICD-10-CM

## 2022-01-15 DIAGNOSIS — M54.9 CHRONIC BACK PAIN, UNSPECIFIED BACK LOCATION, UNSPECIFIED BACK PAIN LATERALITY: ICD-10-CM

## 2022-01-15 PROCEDURE — G1004 CDSM NDSC: HCPCS

## 2022-01-15 PROCEDURE — 72146 MRI CHEST SPINE W/O DYE: CPT

## 2022-01-15 PROCEDURE — 72148 MRI LUMBAR SPINE W/O DYE: CPT

## 2022-02-03 ENCOUNTER — OFFICE VISIT (OUTPATIENT)
Dept: OBGYN CLINIC | Facility: OTHER | Age: 37
End: 2022-02-03
Payer: COMMERCIAL

## 2022-02-03 VITALS
WEIGHT: 166 LBS | BODY MASS INDEX: 26.68 KG/M2 | HEART RATE: 68 BPM | DIASTOLIC BLOOD PRESSURE: 75 MMHG | SYSTOLIC BLOOD PRESSURE: 133 MMHG | HEIGHT: 66 IN

## 2022-02-03 DIAGNOSIS — M54.9 CHRONIC BACK PAIN, UNSPECIFIED BACK LOCATION, UNSPECIFIED BACK PAIN LATERALITY: ICD-10-CM

## 2022-02-03 DIAGNOSIS — G89.29 CHRONIC BACK PAIN, UNSPECIFIED BACK LOCATION, UNSPECIFIED BACK PAIN LATERALITY: ICD-10-CM

## 2022-02-03 DIAGNOSIS — M54.2 NECK PAIN: Primary | ICD-10-CM

## 2022-02-03 PROCEDURE — 99213 OFFICE O/P EST LOW 20 MIN: CPT | Performed by: FAMILY MEDICINE

## 2022-02-03 RX ORDER — METHYLPREDNISOLONE 4 MG/1
TABLET ORAL
Qty: 21 TABLET | Refills: 0 | Status: SHIPPED | OUTPATIENT
Start: 2022-02-03

## 2022-02-03 RX ORDER — CYCLOBENZAPRINE HCL 10 MG
10 TABLET ORAL
Qty: 30 TABLET | Refills: 1 | Status: SHIPPED | OUTPATIENT
Start: 2022-02-03 | End: 2022-02-18

## 2022-02-03 NOTE — PATIENT INSTRUCTIONS
Recommend follow-up with Pain Management this month on February 18th  Recommend MRI cervical vertebra    Educated risks of mixing NSAIDS ( (non-steroidal anti-inflammatory pills including advil, ibuprofen, motrin, meloxicam, celecoxib, aleve, naproxen, and aspirin containing products) with each other or with steroids (such as prednisone, medrol)  Explained risks of mixing these medications including stomach ulcer, severe internal bleeding, and kidney failure  Instructed not to take NSAIDS if have history of stomach ulcers, kidney issues, or uncontrolled hypertension  Instructed patient to use only one brand as prescribed  For naproxen, a maximum of 500 mg per dose every 12 hours and no more than two doses or 1,000mg per day  For Ibuprofen, a maximum of 800 mg per dose every 6 hours but no more than 3 doses or 2,400 mg per day  Never take these medications together  Never take these medications the same day  For severe pain and only if you have no liver problems, you may add Tylenol (also known as acetaminophen) maximum of 1,000  Mg per dose every 6 hours but no more 3 doses or 3,000 mg per day  Patient expressed understanding and agreed to plan  risks of muscle relaxant medications (examples: flexeril, cyclobenzaprin, robaxin, methocarbamol, zanaflex, tizanidine)  include dizziness and drowsiness which may lead to falls or motor vehicle accidents and significant injury to self or others  Due to this, I recommend against taking muscle relaxants over 72years of age or if considered frail or have multiple chronic medical conditions, against driving or operating machinery, and against caring for children while taking muscle relaxants      There are also risks of mixing muscle relaxants with other medications such as benadryl, diphenhydramine, sleeping pills, opiate pain pills or other narcotic pain pills alcohol, benzodiazepines such as xanax (alprazolam), ativan (lorazepam), clonazepam, valium (diazepam) and other sedating medications including worsening drowsiness and risk of fall or motor vehicle accident, respiratory depression or trouble breathing, and death

## 2022-02-03 NOTE — PROGRESS NOTES
1  Neck pain  MRI cervical spine wo contrast   2  Chronic back pain, unspecified back location, unspecified back pain laterality  methylPREDNISolone 4 MG tablet therapy pack    cyclobenzaprine (FLEXERIL) 10 mg tablet    Ambulatory referral to Pain Management     Orders Placed This Encounter   Procedures    MRI cervical spine wo contrast    Ambulatory referral to Pain Management        Imaging Studies (I personally reviewed images in PACS and report):    MRI of lumbar spine 01/15/2022: L5-S1: There is disc space degeneration  There is no significant canal stenosis or foraminal narrowing  MRI of thoracic spine 01/15/2022: Dextro scoliosis of the thoracic spine  Minimal degenerative changes as described above without significant canal stenosis or foraminal narrowing  X-ray of entire spine ( Scoliosis 2-3 view) 09/30/2021: Thoracolumbar dextroscoliosis with [place] angle of 38°  IMPRESSION:  Thoracolumbar dextroscoliosis      Repeat X-ray next visit: None    Return for Follow-up with specialist     Patient Instructions   Recommend follow-up with Pain Management this month on February 18th  Recommend MRI cervical vertebra    Educated risks of mixing NSAIDS ( (non-steroidal anti-inflammatory pills including advil, ibuprofen, motrin, meloxicam, celecoxib, aleve, naproxen, and aspirin containing products) with each other or with steroids (such as prednisone, medrol)  Explained risks of mixing these medications including stomach ulcer, severe internal bleeding, and kidney failure  Instructed not to take NSAIDS if have history of stomach ulcers, kidney issues, or uncontrolled hypertension  Instructed patient to use only one brand as prescribed  For naproxen, a maximum of 500 mg per dose every 12 hours and no more than two doses or 1,000mg per day  For Ibuprofen, a maximum of 800 mg per dose every 6 hours but no more than 3 doses or 2,400 mg per day  Never take these medications together   Never take these medications the same day  For severe pain and only if you have no liver problems, you may add Tylenol (also known as acetaminophen) maximum of 1,000  Mg per dose every 6 hours but no more 3 doses or 3,000 mg per day  Patient expressed understanding and agreed to plan  risks of muscle relaxant medications (examples: flexeril, cyclobenzaprin, robaxin, methocarbamol, zanaflex, tizanidine)  include dizziness and drowsiness which may lead to falls or motor vehicle accidents and significant injury to self or others  Due to this, I recommend against taking muscle relaxants over 72years of age or if considered frail or have multiple chronic medical conditions, against driving or operating machinery, and against caring for children while taking muscle relaxants  There are also risks of mixing muscle relaxants with other medications such as benadryl, diphenhydramine, sleeping pills, opiate pain pills or other narcotic pain pills alcohol, benzodiazepines such as xanax (alprazolam), ativan (lorazepam), clonazepam, valium (diazepam) and other sedating medications including worsening drowsiness and risk of fall or motor vehicle accident, respiratory depression or trouble breathing, and death  CHIEF COMPLAINT:  Follow-up scoliosis    HPI:  Tyler Alonzo is a 29 y o  female  who presents for       Visit 2/3/2022 :  Patient was last examined on 12/09/2021  She had x-rays consistent with dextroscoliosis of her lumbar spine and was sent for an MRI of her thoracic and lumbar spine for further evaluation  She states she has still been having pain in her neck and upper back  She has had occasional sensation of her legs sleeping when she crosses it for about 10 minutes  Denies any numbness and tingling of the fingers  Denies any numbness and tingling going her feet  Denies any new injury  Denies any fevers, weight changes, urinary bladder incontinence            Review of Systems   Constitutional: Negative for chills and fever    HENT: Negative for ear pain and sore throat  Eyes: Negative for pain and visual disturbance  Respiratory: Negative for cough and shortness of breath  Cardiovascular: Negative for chest pain and palpitations  Gastrointestinal: Negative for abdominal pain and vomiting  Genitourinary: Negative for dysuria and hematuria  Musculoskeletal: Negative for arthralgias and back pain  Skin: Negative for color change and rash  Neurological: Negative for seizures and syncope  All other systems reviewed and are negative          Following history reviewed and update:    Past Medical History:   Diagnosis Date    Depression 2016    18 wk loss of gestation, no rx no SI/HI    Obesity affecting pregnancy, antepartum 3/29/2019    Postpartum depression     18 wk loss of gestation, no rx no SI/HI    Scoliosis of thoracolumbar spine 2021    Varicella     vac     Past Surgical History:   Procedure Laterality Date     SECTION      DC  DELIVERY ONLY N/A 2019    Procedure:  SECTION () REPEAT;  Surgeon: Dory Vanessa MD;  [facility] ;  Service: Obstetrics    TUBAL LIGATION Bilateral 2019    Procedure: LIGATION/COAGULATION TUBAL;  Surgeon: Dory Vanessa MD;  [facility] ;  Service: Obstetrics     Social History   Social History     Substance and Sexual Activity   Alcohol Use Not Currently    Comment: Social     Social History     Substance and Sexual Activity   Drug Use No     Social History     Tobacco Use   Smoking Status Heavy Tobacco Smoker    Packs/day: 0 50    Types: Cigarettes   Smokeless Tobacco Never Used   Tobacco Comment    quit with preg     Family History   Problem Relation Age of Onset    Diabetes Father         type 2    Diabetes Brother         type 2    Hypertension Maternal Uncle      No Known Allergies       Physical Exam  /75 (BP Location: Left arm, Patient Position: Sitting, Cuff Size: Adult)   Pulse 68   Ht 5' 6" (1 676 m)   Wt 75 3 kg (166 lb)   LMP 01/08/2022   BMI 26 79 kg/m²     Constitutional:  see vital signs  Gen: well-developed, normocephalic/atraumatic, well-groomed  Eyes: No inflammation or discharge of conjunctiva or lids; sclera clear   Pharynx: no inflammation, lesion, or mass of lips  Neck: supple, no masses, non-distended  MSK: no inflammation, lesion, mass, or clubbing of nails and digits except for other than mentioned below  SKIN: no visible rashes or skin lesions  Pulmonary/Chest: Effort normal  No respiratory distress     NEURO: cranial nerves grossly intact  PSYCH:  Alert and oriented to person, place, and time; recent and remote memory intact; mood normal, no depression, anxiety, or agitation, judgment and insight good and intact     Ortho Exam    Cervical  ROM: intact  Midline spinous process tenderness: None  Muscular Tenderness:  Right-sided paraspinal tenderness  Sensation UE Bilateral:  C5: normal  C6: normal  C7: normal  C8: normal  T1: normal  Strength UE: 5/5 elbow, wrist, fingers bilateral  Reflexes:   Spurlings:       BACK EXAM:  Gait: normal, no trendelenberg gait, no antalgic gait    BACK TENDERNESS:  Spinous Processes: no  Paraspinal Muscles: no  SI Joint: no  Sacrum: no    ROM:  Flexion: intact  Extension: intact  Sidebending: intact    DERMATOMAL SENSATION:  L1: normal   L2: normal   L3: normal   L4: normal   L5: normal   S1: normal    STRENGTH (bilateral):  Knee Extension: 5/5  Knee Flexion: 5/5  Foot Dorsiflexion: 5/5  Great Toe Extension: 5/5  Foot Plantarflexion: 5/5  Hip Flexion: 5/5  Hip Abduction: 5/5    REFLEXES:  Patellar: 2+ bilateral  Achilles: 2+ bilateral  Clonus: negative bilateral    BACK:   SUPINE STRAIGHT LEG: negative  PRONE STRAIGHT LEG:  SLUMP: negative    RIGHT HIP:  LOG ROLL: negative  EZIO: negative  FADIR: negative    LEFT HIP:  LOG ROLL: negative  EZIO:  Reproduces back pain  FADIR: negative    SI JOINT:  ASIS COMPRESSION TEST:   GAENSLIN'S TEST:   STORK TEST: FORTON'S FINGER:   EZIO [place] PAIN:       Procedures

## 2022-02-18 ENCOUNTER — CONSULT (OUTPATIENT)
Dept: PAIN MEDICINE | Facility: CLINIC | Age: 37
End: 2022-02-18
Payer: COMMERCIAL

## 2022-02-18 VITALS
HEIGHT: 66 IN | HEART RATE: 67 BPM | WEIGHT: 165 LBS | BODY MASS INDEX: 26.52 KG/M2 | SYSTOLIC BLOOD PRESSURE: 108 MMHG | DIASTOLIC BLOOD PRESSURE: 65 MMHG

## 2022-02-18 DIAGNOSIS — G89.29 CHRONIC BACK PAIN, UNSPECIFIED BACK LOCATION, UNSPECIFIED BACK PAIN LATERALITY: ICD-10-CM

## 2022-02-18 DIAGNOSIS — M54.9 MID BACK PAIN: ICD-10-CM

## 2022-02-18 DIAGNOSIS — M54.9 CHRONIC BACK PAIN, UNSPECIFIED BACK LOCATION, UNSPECIFIED BACK PAIN LATERALITY: ICD-10-CM

## 2022-02-18 DIAGNOSIS — M79.18 MYOFASCIAL PAIN: Primary | ICD-10-CM

## 2022-02-18 DIAGNOSIS — M41.9 SCOLIOSIS OF THORACOLUMBAR SPINE, UNSPECIFIED SCOLIOSIS TYPE: ICD-10-CM

## 2022-02-18 PROCEDURE — 99244 OFF/OP CNSLTJ NEW/EST MOD 40: CPT | Performed by: ANESTHESIOLOGY

## 2022-02-18 RX ORDER — TIZANIDINE 2 MG/1
2 TABLET ORAL 2 TIMES DAILY PRN
Qty: 60 TABLET | Refills: 1 | Status: SHIPPED | OUTPATIENT
Start: 2022-02-18 | End: 2022-04-01

## 2022-02-18 NOTE — PATIENT INSTRUCTIONS
Tizanidine (Por la boca)   Tizanidine ([id number])  Se [place] para tratar la espasticidad muscular  Migdalia(s) : Zanaflex, Zanaflex Capsule   Existen muchas otras marcas de [employer]  Tsering medicamento no debe ser usado cuando:   Tsering medicamento no es adecuado para todas las personas  No lo use si usted ha tenido kurt reacción alérgica a la tizanidina  Forma de usar tsering medicamento:   Nicky Nailer  · Cobb justus medicamentos lavinia se le haya indicado  Es probable que sea necesario cambiar estrada dosis varias veces hasta encontrar la que funciona mejor para usted  · Tsering medicamento se puede genna con o sin alimentos, tarik siempre se debe de genna de la misma manera cada vez  La tizanidina funciona de manera distinta dependiendo si se isabel con el estómago vacío o lleno  Consulte con el médico si usted tiene preguntas al [employer]  · Si olvida kurt dosis: Cobb la dosis tan pronto lavinia lo recuerde  Si es irene la hora para estrada próxima dosis, espere hasta entonces para genna estrada dosis regular  No tome medicamento adicional para reponer la dosis que omitió  · Guarde el medicamento en un recipiente cerrado a temperatura ambiente y alejado del calor, la humedad y la vangie directa  Medicamentos y [employer] que debe evitar:   Consulte con estrada médico o farmacéutico antes de usar cualquier medicamento, incluyendo los que compra sin receta médica, las vitaminas y los productos herbales  · No use tsering medicamento con ciprofloxacino o fluvoxamina  · Algunos medicamentos y alimentos pueden afectar la eficacia de tizanidina  Informe a estrada médico si está usando cualquiera de los siguientes:  ? Aciclovir, baclofeno, cimetidina, famotidina, ticlopidina, verapamilo, zileutón  ?  Píldoras anticonceptivas, medicamentos para la presión arterial, medicamentos para los problemas con el ritmo cardíaco (lavinia amiodarona, [place], propafenona), o medicamentos para tratar kurt infección (lavinia levofloxacina, moxifloxacina)  · No consuma alcohol mientras esté   · Informe a estrada médico si usted [place] cualquier cosa que le provoca sueño  Pilar Arlette son medicamentos para alergia o medicamentos narcóticos para el dolor y el alcohol  Precauciones gustavo el uso de anton medicamento:   · Infórmele al médico si usted está embarazada o dando de lactar, o si tiene enfermedad renal o hepática  · Anton medicamento puede causar los siguientes problemas:  ? Presión arterial baja  ? Daño hepático  · Anton medicamento podría causarle a usted mareos o somnolencia  No maneje un vehículo ni juice ninguna tarea que pueda ser peligrosa hasta que usted sepa cómo lo afecta anton medicamento  Levántese o siéntese despacio si está mareado  · No suspenda el uso de anton medicamento súbitamente  Estrada médico necesitará disminuir estrada dosis poco a poco antes de suspender el medicamento por completo  · El médico solicitará exámenes de laboratorio gustavo las citas de rutina para revisar los efectos de Christian  Asista a todas justus citas  · Guarde todos los medicamentos fuera del alcance de los niños  Nunca comparta justus medicamentos con [employer]  Efectos secundarios que pueden presentarse gustavo el uso de anton medicamento:   Consulte inmediatamente con el médico si nota cualquiera de estos efectos secundarios:  · Reacción alérgica: Comezón o ronchas, hinchazón del adebayo o las syd, hinchazón u hormigueo en la boca o garganta, opresión en el pecho, dificultad para respirar  · [employer] o heces pálidas, náuseas, vómitos, falta de apetito, dolor estomacal, coloración amarillenta en la piel u ojos  · Desvanecimientos, mareos o desmayos  · Mirela o escuchar cosas que no existen  · Ritmo cardíaco lento  Consulte con el médico si nota los siguientes efectos secundarios menos graves:   · Sequedad en la boca  · Sueño o somnolencia  · Debilidad  Consulte con el médico si nota otros efectos secundarios que helio son causados por anton medicamento  Llame a estrada médico para consultarle [facility] Hospitalbryson Malcolm puede notificar justus efectos secundarios al [facility] al [phone]  © Copyright [facility] 2021 Information is for End User's use only and may not be sold, redistributed or otherwise used for commercial purposes  Esta información es sólo para uso en educación  Estrada intención no es darle un consejo médico sobre enfermedades o tratamientos  Colsulte con estrada Dara Hernandez farmacéutico antes de seguir cualquier régimen médico para saber si es seguro y efectivo para usted

## 2022-02-18 NOTE — PROGRESS NOTES
Assessment  1  Myofascial pain    2  Chronic back pain, unspecified back location, unspecified back pain laterality    3  Scoliosis of thoracolumbar spine, unspecified scoliosis type    4  Mid back pain        Plan  81-year-old female referred by Dr Ely Orantes, presenting for initial consultation regarding right-sided mid to lower thoracic and upper lumbar back pain without any radicular symptoms into the thorax, abdomen, or lower extremities  She has been dealing with the symptoms for many years  She denies any trauma or inciting event  Scoliosis films show dextroscoliosis with a Montes De Oca angle of 38°  MRI of the thoracic spine shows disc space narrowing at T8-9 and T9-10  There is no significant central or foraminal stenosis noted  MRI of the lumbar spine does not show any significant central or foraminal stenosis or any significant degenerative changes  She does have an MRI of her cervical spine scheduled for the end of this month  The patient has done physical therapy without much relief  She has taken cyclobenzaprine, Tylenol, and various NSAIDs without much relief  She was prescribed a course of oral steroids, however lost this prescription and was unable to try it  The patient's thoracic and lumbar back pain seems to be mostly myofascial in nature especially consider scoliotic deformity  No evidence of radiculopathy or myelopathy on exam   No compressive pathology in the thoracic or lumbar spine  1  I will prescribe a trial of tizanidine 2 mg q 8 hours p r n  for myofascial pain and patient will discontinue cyclobenzaprine secondary to ineffectiveness  2  Patient will continue with her home exercise program  3  Patient will proceed with MRI of the cervical spine as ordered  4  I will follow up the patient in 6 weeks       My impressions and treatment recommendations were discussed in detail with the patient who verbalized understanding and had no further questions    Discharge instructions were provided  I personally saw and examined the patient and I agree with the above discussed plan of care  No orders of the defined types were placed in this encounter  No orders of the defined types were placed in this encounter  History of Present Illness    Irineo Berrios is a 29 y o  female referred by Dr Thaddeus Agee, presenting for initial consultation regarding right-sided mid to lower thoracic and upper lumbar back pain without any radicular symptoms into the thorax, abdomen, or lower extremities  She has been dealing with the symptoms for many years  She denies any trauma or inciting event  She denies any significant neck pain or upper extremity radicular symptoms today  She denies any bladder or bowel incontinence or saddle anesthesia  Scoliosis films show dextroscoliosis with a Montes De Oca angle of 38°  MRI of the thoracic spine shows disc space narrowing at T8-9 and T9-10  There is no significant central or foraminal stenosis noted  MRI of the lumbar spine does not show any significant central or foraminal stenosis or any significant degenerative changes  She does have an MRI of her cervical spine scheduled for the end of this month  The patient has done physical therapy without much relief  She has taken cyclobenzaprine, Tylenol, and various NSAIDs without much relief  She was prescribed a course of oral steroids, however lost this prescription and was unable to try it  The patient rates her pain moderate and intermittent  The pain is described as aching and cramping  The pain is worse with standing, walking, bending, and lifting  The pain is alleviated with sitting, relaxation, and lying down  Other than as stated above, the patient denies any interval changes in medications, medical condition, mental condition, symptoms, or allergies since the last office visit            I have personally reviewed and/or updated the patient's past medical history, past surgical history, family history, social history, current medications, allergies, and vital signs today       Review of Systems    Patient Active Problem List   Diagnosis    Pterygium of both eyes    36 weeks gestation of pregnancy    Obesity affecting pregnancy, antepartum    Request for sterilization    39 weeks gestation of pregnancy    Previous  section complicating pregnancy    S/P repeat low transverse     Status post bilateral salpingectomy    Scoliosis of thoracolumbar spine       Past Medical History:   Diagnosis Date    Depression 2016    18 wk loss of gestation, no rx no SI/HI    Obesity affecting pregnancy, antepartum 3/29/2019    Postpartum depression     18 wk loss of gestation, no rx no SI/HI    Scoliosis of thoracolumbar spine 2021    Varicella     vac       Past Surgical History:   Procedure Laterality Date     SECTION      TX  DELIVERY ONLY N/A 2019    Procedure:  SECTION () REPEAT;  Surgeon: Michelle Mcconnell MD;  Location: [facility];  Service: Obstetrics    TUBAL LIGATION Bilateral 2019    Procedure: LIGATION/COAGULATION TUBAL;  Surgeon: Michelle Mcconnell MD;  [facility]: [facility] ;  Service: Obstetrics       Family History   Problem Relation Age of Onset    Diabetes Father         type 2    Diabetes Brother         type 2    Hypertension Maternal Uncle        Social History     Occupational History    Not on file   Tobacco Use    Smoking status: Heavy Tobacco Smoker     Packs/day: 0 50     Types: Cigarettes    Smokeless tobacco: Never Used    Tobacco comment: quit with preg   Vaping Use    Vaping Use: Every day    Substances: Nicotine, Flavoring   Substance and Sexual Activity    Alcohol use: Not Currently     Comment: Social    Drug use: No    Sexual activity: Yes     Partners: Male     Birth control/protection: None       Current Outpatient Medications on File Prior to Visit   Medication Sig    acetaminophen (TYLENOL) 325 mg tablet Take 2 tablets (650 mg total) by mouth every 6 (six) hours as needed for mild pain, headaches or fever    acetaminophen (TYLENOL) 500 mg tablet Take 2 tablets (1,000 mg total) by mouth every 6 (six) hours as needed for mild pain or headaches    cyclobenzaprine (FLEXERIL) 10 mg tablet Take 1 tablet (10 mg total) by mouth daily at bedtime as needed for muscle spasms    docusate sodium (COLACE) 100 mg capsule Take 1 capsule (100 mg total) by mouth 2 (two) times a day    ibuprofen (MOTRIN) 600 mg tablet Take 1 tablet (600 mg total) by mouth every 6 (six) hours as needed for moderate pain    methylPREDNISolone 4 MG tablet therapy pack Use as directed on package    naproxen (NAPROSYN) 500 mg tablet Take 1 tablet (500 mg total) by mouth 2 (two) times a day as needed for mild pain    Prenatal Vit-Fe Fumarate-FA (PRENATAL VITAMIN) 27-0 8 MG TABS Take 1 tablet by mouth daily    topiramate (TOPAMAX) 25 mg tablet       No current facility-administered medications on file prior to visit  No Known Allergies    Physical Exam    Ht 5' 6" (1 676 m)   BMI 26 79 kg/m²     Constitutional: normal, well developed, well nourished, alert, in no distress and non-toxic and no overt pain behavior  Eyes: anicteric  HEENT: grossly intact  Neck: supple, symmetric, trachea midline and no masses   Pulmonary:even and unlabored  Cardiovascular:No edema or pitting edema present  Skin:Normal without rashes or lesions and well hydrated  Psychiatric:Mood and affect appropriate  Neurologic:Cranial Nerves II-XII grossly intact  Musculoskeletal:normal gait  Scoliosis noted of the thoracolumbar spine  Right-sided thoracic paraspinals and lumbar paraspinals mildly tender to palpation ropy in texture  Bilateral cervical paraspinals nontender to palpation  Bilateral biceps, triceps, brachioradialis, patellar, and Achilles reflexes were 2/4 and symmetrical   No clonus was noted bilaterally  Negative Kendrick's reflex bilaterally    Bilateral upper and lower extremity strength 5/5 in all muscle groups  Sensation intact to light touch in C5 through T1 dermatomes and L3 through S1 dermatomes bilaterally  Negative Spurling's bilaterally  Negative straight leg raise bilaterally  Negative Federico's test bilaterally  Imaging    PACS Images     Show images for MRI lumbar spine wo contrast    Study Result    Narrative & Impression   MRI LUMBAR SPINE WITHOUT CONTRAST     INDICATION: M41 9: Scoliosis, unspecified  M54 9: Dorsalgia, unspecified  G89 29: Other chronic pain      COMPARISON:  None      TECHNIQUE:  Sagittal T1, sagittal T2, sagittal inversion recovery, axial T1 and axial T2, coronal T2     IMAGE QUALITY:  The study is motion degraded      FINDINGS:     VERTEBRAL BODIES:  There are 5 lumbar type vertebral bodies  There is levoscoliosis of the lumbar spine  There is no significant spondylolisthesis  Normal marrow signal is identified within the visualized bony structures  No discrete marrow lesion  No segmentation anomalies identified      SACRUM:  Normal signal within the sacrum  No evidence of insufficiency or stress fracture      DISTAL CORD AND CONUS:  Normal size and signal within the distal cord and conus      PARASPINAL SOFT TISSUES:  Right mid pole renal cyst      LOWER THORACIC DISC SPACES:  Normal disc height and signal   No disc herniation, canal stenosis or foraminal narrowing      LUMBAR DISC SPACES:     L1-L2:  Normal      L2-L3:  Normal      L3-L4:  Normal      L4-L5:  Normal      L5-S1:  There is disc space degeneration  There is no significant canal stenosis or foraminal narrowing      IMPRESSION:     Disc space degeneration at L5-S1    No significant canal stenosis or foraminal narrowing identified      Levoscoliosis of the lumbar spine         Workstation performed: [id number]       PACS Images     Show images for MRI thoracic spine wo contrast    Study Result    Narrative & Impression   MRI THORACIC SPINE WITHOUT CONTRAST     INDICATION: M41 9: Scoliosis, unspecified  M54 9: Dorsalgia, unspecified  G89 29: Other chronic pain      COMPARISON:  9/30/2021     TECHNIQUE:  Sagittal T1, sagittal T2, sagittal inversion recovery, axial T2,  axial 2D MERGE      IMAGE QUALITY: Diagnostic      FINDINGS:     ALIGNMENT: There is dextroscoliosis  Please refer to prior radiographs for Montes De Oca angle  There is no significant spondylolisthesis      MARROW SIGNAL:  Normal marrow signal is identified within the visualized bony structures  No discrete marrow lesion  No segmentation anomalies are seen      THORACIC CORD: Normal signal within the thoracic cord      PARAVERTEBRAL SOFT TISSUES:  Normal      THORACIC DEGENERATIVE CHANGE: No significant canal stenosis or foraminal narrowing within the thoracic spine  Mild disc space narrowing at T8-T9 and T9-T10      Minimal bulge at C6-C7 with disc space degeneration  No significant canal stenosis or foraminal narrowing      IMPRESSION:     Dextro scoliosis of the thoracic spine  Minimal degenerative changes as described above without significant canal stenosis or foraminal narrowing            Workstation performed: [id number]     PACS Images     Show images for XR entire spine (scoliosis) 2-3 vw    Study Result    Narrative & Impression   SCOLIOSIS      INDICATION:   M41 9: Scoliosis, unspecified      COMPARISON:  None     VIEWS:  XR ENTIRE SPINE (SCOLIOSIS) 2-3 VW         FINDINGS:     There is no fracture, pathologic bone lesion or congenital segmentation anomaly      Moderate dextroscoliosis of the thoracolumbar spine with a Montes De Oca angle of 38 degrees as measured from the superior endplate of T8 to the inferior endplate of L1  Presho is at T10      IMPRESSION:     1    Dextroscoliosis of the thoracolumbar spine with a Montes De Oca angle of 38 degrees         Workstation performed: [id number]

## 2022-02-27 ENCOUNTER — HOSPITAL ENCOUNTER (OUTPATIENT)
Dept: RADIOLOGY | Facility: HOSPITAL | Age: 37
Discharge: HOME | End: 2022-02-27
Attending: FAMILY MEDICINE
Payer: COMMERCIAL

## 2022-02-27 DIAGNOSIS — M54.2 NECK PAIN: ICD-10-CM

## 2022-02-27 PROCEDURE — 72141 MRI NECK SPINE W/O DYE: CPT

## 2022-02-27 PROCEDURE — G1004 CDSM NDSC: HCPCS

## 2022-04-01 ENCOUNTER — OFFICE VISIT (OUTPATIENT)
Dept: PAIN MEDICINE | Facility: CLINIC | Age: 37
End: 2022-04-01
Payer: COMMERCIAL

## 2022-04-01 VITALS
BODY MASS INDEX: 26.52 KG/M2 | SYSTOLIC BLOOD PRESSURE: 99 MMHG | HEIGHT: 66 IN | DIASTOLIC BLOOD PRESSURE: 70 MMHG | WEIGHT: 165 LBS | HEART RATE: 82 BPM

## 2022-04-01 DIAGNOSIS — M54.2 NECK PAIN: ICD-10-CM

## 2022-04-01 DIAGNOSIS — M79.18 MYOFASCIAL PAIN: Primary | ICD-10-CM

## 2022-04-01 PROCEDURE — 99214 OFFICE O/P EST MOD 30 MIN: CPT | Performed by: NURSE PRACTITIONER

## 2022-04-01 RX ORDER — TIZANIDINE 4 MG/1
4 TABLET ORAL EVERY 8 HOURS PRN
Qty: 90 TABLET | Refills: 1 | Status: SHIPPED | OUTPATIENT
Start: 2022-04-01

## 2022-04-01 NOTE — PROGRESS NOTES
Assessment:  1  Myofascial pain    2  Neck pain        Plan:  1  I will increase tizanidine to 4 mg q 8 hours p r n  myofascial pain  I advised the patient that if they experience any side effects or issues with the changes in their medication regiment, they should give our office a call to discuss  I also advised the patient not to drive or operate machinery until they see how the changes in the medication regimen affects them  The patient was agreeable and verbalized an understanding  2  I offered to schedule the patient for trigger point injections, however she declines   3  I offered to initiate patient aqua therapy, however she declines  4  Could consider a cervical epidural steroid injection in the future  5  Patient will follow-up in 4 weeks or sooner if needed     M*Modal software was used to dictate this note  It may contain errors with dictating incorrect words or incorrect spelling  Please contact the provider directly with any questions  Please note that the patient is primarily Chinese speaking and required interpretive services for this office visit  The patient elected to have her son who was present interpret the entire office visit  History of Present Illness: The patient is a 29 y o  female last seen on 2/18/22 who presents for a follow up office visit in regards to chronic right-sided thoracic pain localizes near her scapula  It does not radiate into the chest wall, thorax or ribcage  She is not having any significant neck pain and denies any upper extremity symptoms  MRI of the cervical spine reveals some mild central stenosis at C6-7 secondary to a disc bulge however unremarkable otherwise  MRI of the thoracic and lumbar spine are largely unremarkable  She does have dextroscoliosis with a Montes De Oca angle of 38°  She has completed physical therapy without relief  She is taking tizanidine 2 mg twice a day p r n  without much relief    She does find some relief with ibuprofen  The patient rates her pain an 8/10 on the numeric pain rating scale  She intermittently has pain in the evening    I have personally reviewed and/or updated the patient's past medical history, past surgical history, family history, social history, current medications, allergies, and vital signs today  Review of Systems:    Review of Systems   Respiratory: Negative for shortness of breath  Cardiovascular: Negative for chest pain  Gastrointestinal: Negative for constipation, diarrhea, nausea and vomiting  Musculoskeletal: Positive for gait problem  Negative for arthralgias, joint swelling and myalgias  Skin: Negative for rash  Neurological: Negative for dizziness, seizures and weakness  All other systems reviewed and are negative          Past Medical History:   Diagnosis Date    Depression 2016    18 wk loss of gestation, no rx no SI/HI    Obesity affecting pregnancy, antepartum 3/29/2019    Postpartum depression     18 wk loss of gestation, no rx no SI/HI    Scoliosis of thoracolumbar spine 2021    Varicella     vac       Past Surgical History:   Procedure Laterality Date     SECTION      TX  DELIVERY ONLY N/A 2019    Procedure:  SECTION () REPEAT;  Surgeon: Blair Spencer MD;  Location[facility];  Service: Obstetrics    TUBAL LIGATION Bilateral 2019    Procedure: LIGATION/COAGULATION TUBAL;  Surgeon: Blair Spencer MD;  Location: [facility] ;  Service: Obstetrics       Family History   Problem Relation Age of Onset    Diabetes Father         type 2    Diabetes Brother         type 2    Hypertension Maternal Uncle        Social History     Occupational History    Not on file   Tobacco Use    Smoking status: Heavy Tobacco Smoker     Packs/day: 0 50     Types: Cigarettes    Smokeless tobacco: Never Used    Tobacco comment: quit with preg   Vaping Use    Vaping Use: Every day    Substances: Nicotine, Flavoring   Substance and Sexual Activity    Alcohol use: Not Currently     Comment: Social    Drug use: No    Sexual activity: Yes     Partners: Male     Birth control/protection: None         Current Outpatient Medications:     acetaminophen (TYLENOL) 325 mg tablet, Take 2 tablets (650 mg total) by mouth every 6 (six) hours as needed for mild pain, headaches or fever (Patient not taking: Reported on 2/18/2022 ), Disp: 30 tablet, Rfl: 0    acetaminophen (TYLENOL) 500 mg tablet, Take 2 tablets (1,000 mg total) by mouth every 6 (six) hours as needed for mild pain or headaches, Disp: 30 tablet, Rfl: 0    docusate sodium (COLACE) 100 mg capsule, Take 1 capsule (100 mg total) by mouth 2 (two) times a day (Patient not taking: Reported on 2/18/2022 ), Disp: 10 capsule, Rfl: 0    ibuprofen (MOTRIN) 600 mg tablet, Take 1 tablet (600 mg total) by mouth every 6 (six) hours as needed for moderate pain, Disp: 30 tablet, Rfl: 0    methylPREDNISolone 4 MG tablet therapy pack, Use as directed on package (Patient not taking: Reported on 2/18/2022 ), Disp: 21 tablet, Rfl: 0    naproxen (NAPROSYN) 500 mg tablet, Take 1 tablet (500 mg total) by mouth 2 (two) times a day as needed for mild pain, Disp: 30 tablet, Rfl: 0    Prenatal Vit-Fe Fumarate-FA (PRENATAL VITAMIN) 27-0 8 MG TABS, Take 1 tablet by mouth daily (Patient not taking: Reported on 2/18/2022 ), Disp: 90 tablet, Rfl: 3    tiZANidine (ZANAFLEX) 2 mg tablet, Take 1 tablet (2 mg total) by mouth 2 (two) times a day as needed for muscle spasms, Disp: 60 tablet, Rfl: 1    topiramate (TOPAMAX) 25 mg tablet,  , Disp: , Rfl:     No Known Allergies    Physical Exam:    BP 99/70   Pulse 82   Ht 5' 6" (1 676 m)   Wt 74 8 kg (165 lb)   BMI 26 63 kg/m²     Constitutional:normal, well developed, well nourished, alert, in no distress and non-toxic and no overt pain behavior    Eyes:anicteric  HEENT:grossly intact  Neck:supple, symmetric, trachea midline and no masses   Pulmonary:even and unlabored  Cardiovascular:No edema or pitting edema present  Skin:Normal without rashes or lesions and well hydrated  Psychiatric:Mood and affect appropriate  Neurologic:Cranial Nerves II-XII grossly intact  Musculoskeletal:normal gait  Tenderness to palpation along the boarder of the right scapula      Imaging  No orders to display     MRI CERVICAL SPINE WITHOUT CONTRAST   INDICATION: M54 2: Cervicalgia  Chronic neck pain   COMPARISON: None  TECHNIQUE: Sagittal T1, sagittal T2, sagittal inversion recovery, axial T2, axial 2D merge   IMAGE QUALITY: Diagnostic   FINDINGS:   ALIGNMENT: There is nonspecific straightening of the cervical lordosis without subluxation  MARROW SIGNAL: Normal marrow signal is identified within the visualized bony structures  No discrete marrow lesion  CERVICAL AND VISUALIZED THORACIC CORD: Normal signal within the visualized cord  PREVERTEBRAL AND PARASPINAL SOFT TISSUES: Normal    VISUALIZED POSTERIOR FOSSA: The visualized posterior fossa demonstrates no abnormal signal    CERVICAL DISC SPACES:   C2-C3: Normal    C3-C4: Normal    C4-C5: Normal    C5-C6: There is a small central disc herniation, protrusion type  Mild central canal narrowing  Neural foramina patent bilaterally  C6-C7: There is a diffuse disk bulge  No significant central canal or neural foraminal narrowing  C7-T1: Normal    UPPER THORACIC DISC SPACES: Normal    IMPRESSION:   1  There is nonspecific straightening of the cervical lordosis without subluxation  2  Mild spondylosis  3  No cord compression or cord signal abnormality  MRI THORACIC SPINE WITHOUT CONTRAST   INDICATION: M41 9: Scoliosis, unspecified   M54 9: Dorsalgia, unspecified   G89 29: Other chronic pain  COMPARISON: 9/30/2021   TECHNIQUE: Sagittal T1, sagittal T2, sagittal inversion recovery, axial T2, axial 2D MERGE  IMAGE QUALITY: Diagnostic  FINDINGS:   ALIGNMENT: There is dextroscoliosis   Please refer to prior radiographs for Montes De Oca angle  There is no significant spondylolisthesis  MARROW SIGNAL: Normal marrow signal is identified within the visualized bony structures  No discrete marrow lesion  No segmentation anomalies are seen  THORACIC CORD: Normal signal within the thoracic cord  PARAVERTEBRAL SOFT TISSUES: Normal    THORACIC DEGENERATIVE CHANGE: No significant canal stenosis or foraminal narrowing within the thoracic spine  Mild disc space narrowing at T8-T9 and T9-T10  Minimal bulge at C6-C7 with disc space degeneration  No significant canal stenosis or foraminal narrowing  IMPRESSION:   Dextro scoliosis of the thoracic spine  Minimal degenerative changes as described above without significant canal stenosis or foraminal narrowing  Workstation performed: [id number]            No orders of the defined types were placed in this encounter

## 2022-04-01 NOTE — PATIENT INSTRUCTIONS
Inyección en los puntos desencadenantes   CUIDADO AMBULATORIO:   Kurt inyección en los puntos desencadenantes se [place] para relajar un nudo muscular  [place] ayuda a aliviar el dolor  Es posible que se pueda tratar más de un punto desencadenante gustavo kurt sesión  Cómo prepararse para kurt inyección en los puntos desencadenantes:  · Estrada médico le indicará cómo prepararse  P[place]dale a alguna persona que lo lleve a estrada casa después de la inyección  · Informe a estrada médico sobre [employer] isabel, incluidos los analgésicos, los anticoagulantes y los relajantes musculares  El médico le dirá si necesita dejar de usar alguno de estos medicamentos para la inyección y cuándo debe hacerlo  Le dirá qué medicamentos puede genna o no el día de la inyección  · Informe a estrada médico acerca de todas justus alergias, incluso a los analgésicos  Qué sucederá gustavo la inyección en los puntos desencadenantes:  · Puede estar sentado o acostado, dependiendo de dónde se encuentre el punto desencadenante  Estrada médico palpará el músculo para detectar el nudo  Puede que le marquita la piel sobre el nudo  · Estrada médico coloca kurt aguja a través de estrada piel y en el punto desencadenante  Los calmantes con salina (solución de sal) u otros medicamentos podrían ser puestos a través de la aguja en el punto desencadenante  Estrada médico también podría usar solo kurt aguja seca (sin medicamento)  Tirará de la aguja irene [employer] final y [employer] empujará de [place]  Lo repetirá varias veces hasta que el músculo deje de moverse o se sienta relajado  · El médico retirará la [place] y estirará el área del músculo  Puede aplicar presión en el área gustavo 2 minutos  Se colocará un vendaje sobre el lugar de la inyección para prevenir el sangrado o kurt infección  Qué esperar después de kurt inyección en los puntos desencadenantes:  · Es posible que sienta alivio del dolor de inmediato  Es normal que un poco de dolor comience de nuevo 2 horas después   Avelino Smith bolsa de hielo o un analgésico de venta arpan pueden ayudar a disminuir el dolor  · Es posible que sienta dolor en el lugar de la inyección gustavo unos días  Si necesita otra inyección en la misma área, espere hasta que el área no esté dolorida  · Estrada médico puede darle instrucciones específicas de actividades para que las siga en casa o recomendarle terapia física  En general, debería tratar de Diannia Aid  Evite la actividad extenuante gustavo los primeros 3 o 4 días después de la inyección  · No se ponga más inyecciones si todavía tiene dolor en el punto desencadenante después de 2 o 3 inyecciones  Ries[employer] de [employer] puntos desencadenantes: Usted podría Dianne Lonoke reacción alérgica severa al analgésico inyectado  La inyección puede ser dolorosa o puede que le duela el lugar donde se aplicó  Puede sangrar, tener moretones o desarrollar kurt infección en el área de la inyección  La inyección podría causarle un desvanecimiento  En raras ocasiones, la aguja puede causar daños en los músculos o vasos sanguíneos, o el pulmón puede colapsarse si se aplica la inyección cerca del pecho  Llame al número local de emergencias (911 en los [place]), o pídale a alguien que llame si:  · Estrada boca y garganta están inflamadas  · Usted tiene sibilancias o dificultad para respirar  · Usted tiene dolor de pecho o estrada corazón late más rápido de lo normal     · Usted siente que se va a desmayar  ¿Cuándo shreya buscar atención inmediata? · Estrada adebayo está kong o inflamado  · Usted tiene urticaria que se propaga por todo estrada cuerpo  · Se siente débil o mareado  Llame a estrada médico o especialista en dolor si:  · Usted tiene fiebre dentro de 1 semana después de la inyección  · Tiene enrojecimiento o hinchazón dentro de 1 semana después de la inyección  · Tiene un dolor nuevo o que empeora alrededor de sitio de la inyección      · Usted tiene preguntas o inquietudes acerca de estrada condición o Octavia Mandujano  Cuidados personales:  · Manténgase activo después de recibir inyecciones en los puntos desencadenantes  Mueva justus articulaciones suavemente a través de estrada rango completo de movimiento gustavo la primera semana  Evite la actividad vigorosa gustavo 3 o 4 días  · Kylee estiramientos regulares del músculo con puntos desencadenantes  Coloque kurt suave presión [employer] punto desencadenante, y luego estire el músculo  P[place]dale más información a estrada médico acerca de cómo estirar y aplicar presión  · Aplique hielo al área de la inyección  Use kurt compresa de hielo o ponga hielo en kurt bolsa plástica  [place] la bolsa con kurt toalla antes de aplicarlo  Aplique hielo gustavo 15 a 20 minutos por hora o según indicaciones  · Aplique calor en los puntos desencadenantes  El calor ayuda a relajar los músculos y amber el dolor en el punto desencadenante  Use kurt compresa caliente o kurt almohadilla eléctrica de baja temperatura  Aplique calor gustavo 15 minutos cada hora o lavinia se lo indicaron  Acuda a justus consultas de control con estrada médico o especialista en el dolor según le indicaron: Anote justus preguntas para que se acuerde de hacerlas gustavo justus visitas  © [employer] 2022 Information is for End User's use only and may not be sold, redistributed or otherwise used for commercial purposes  All illustrations and images included in CareNotes® are the copyrighted property of A D A M , [employer]  or [address] [place] [address] es sólo para uso en educación  Estrada intención no es darle un consejo médico sobre enfermedades o tratamientos  Colsulte con estrada K[employer]itt Console farmacéutico antes de seguir cualquier régimen médico para saber si es seguro y efectivo para usted

## 2022-04-27 ENCOUNTER — TELEPHONE (OUTPATIENT)
Dept: PAIN MEDICINE | Facility: CLINIC | Age: 37
End: 2022-04-27

## 2022-06-24 ENCOUNTER — HOSPITAL ENCOUNTER (EMERGENCY)
Facility: HOSPITAL | Age: 37
Discharge: HOME | End: 2022-06-24
Attending: EMERGENCY MEDICINE
Payer: COMMERCIAL

## 2022-06-24 VITALS
SYSTOLIC BLOOD PRESSURE: 120 MMHG | HEIGHT: 66 IN | OXYGEN SATURATION: 100 % | RESPIRATION RATE: 18 BRPM | WEIGHT: 168 LBS | HEART RATE: 96 BPM | DIASTOLIC BLOOD PRESSURE: 92 MMHG | BODY MASS INDEX: 27 KG/M2 | TEMPERATURE: 98.6 F

## 2022-06-24 DIAGNOSIS — H61.23 BILATERAL IMPACTED CERUMEN: ICD-10-CM

## 2022-06-24 DIAGNOSIS — H60.91 RIGHT OTITIS EXTERNA: Primary | ICD-10-CM

## 2022-06-24 PROCEDURE — 99283 EMERGENCY DEPT VISIT LOW MDM: CPT

## 2022-06-24 PROCEDURE — 69209 REMOVE IMPACTED EAR WAX UNI: CPT | Performed by: EMERGENCY MEDICINE

## 2022-06-24 PROCEDURE — 99284 EMERGENCY DEPT VISIT MOD MDM: CPT | Performed by: EMERGENCY MEDICINE

## 2022-06-24 RX ORDER — NEOMYCIN SULFATE, POLYMYXIN B SULFATE AND HYDROCORTISONE 10; 3.5; 1 MG/ML; MG/ML; [USP'U]/ML
4 SUSPENSION/ DROPS AURICULAR (OTIC) ONCE
Status: COMPLETED | OUTPATIENT
Start: 2022-06-24 | End: 2022-06-24

## 2022-06-24 RX ORDER — NEOMYCIN SULFATE, POLYMYXIN B SULFATE AND HYDROCORTISONE 10; 3.5; 1 MG/ML; MG/ML; [USP'U]/ML
4 SUSPENSION/ DROPS AURICULAR (OTIC) 3 TIMES DAILY
Qty: 10 ML | Refills: 0 | Status: SHIPPED | OUTPATIENT
Start: 2022-06-24

## 2022-06-24 RX ADMIN — CARBAMIDE PEROXIDE 5 DROP: 65 LIQUID TOPICAL at 14:16

## 2022-06-24 RX ADMIN — NEOMYCIN SULFATE, POLYMYXIN B SULFATE AND HYDROCORTISONE 4 DROP: 10; 3.5; 1 SUSPENSION/ DROPS AURICULAR (OTIC) at 14:17

## 2022-06-24 NOTE — ED PROVIDER NOTES
History  Chief Complaint   Patient presents with    Earache     R ear pain  Pain x2weeks, after swimming in a ppol  Pain increased severely today  Posterior to ear painful  Denies h/a, sore throat, no sinus pressure  No drainage     Patient is a 43-year-old female with past medical history depression, presenting to the ED for evaluation of right ear pain for the last 2 weeks  Patient states she 1st noticed symptoms after swimming in a pool  She notes progressive pain and pressure in her right ear with some occasional ringing in her ear  No dizziness or ataxia  She denies any fevers, cough or congestion, radiating pain from the ear to the jaw, or any neck discomfort  She does states she has occasional pain behind her right ear  Patient presents to the ED for evaluation  She has not contacted her doctor about these symptoms  Prior to Admission Medications   Prescriptions Last Dose Informant Patient Reported? Taking?    Prenatal Vit-Fe Fumarate-FA (PRENATAL VITAMIN) 27-0 8 MG TABS  Self No No   Sig: Take 1 tablet by mouth daily   Patient not taking: Reported on 2/18/2022    acetaminophen (TYLENOL) 325 mg tablet  Self No No   Sig: Take 2 tablets (650 mg total) by mouth every 6 (six) hours as needed for mild pain, headaches or fever   Patient not taking: Reported on 2/18/2022    acetaminophen (TYLENOL) 500 mg tablet  Self No No   Sig: Take 2 tablets (1,000 mg total) by mouth every 6 (six) hours as needed for mild pain or headaches   docusate sodium (COLACE) 100 mg capsule  Self No No   Sig: Take 1 capsule (100 mg total) by mouth 2 (two) times a day   Patient not taking: Reported on 2/18/2022    ibuprofen (MOTRIN) 600 mg tablet  Self No No   Sig: Take 1 tablet (600 mg total) by mouth every 6 (six) hours as needed for moderate pain   methylPREDNISolone 4 MG tablet therapy pack   No No   Sig: Use as directed on package   Patient not taking: Reported on 2/18/2022    tiZANidine (ZANAFLEX) 4 mg tablet   No No Sig: Take 1 tablet (4 mg total) by mouth every 8 (eight) hours as needed for muscle spasms   topiramate (TOPAMAX) 25 mg tablet  Self Yes No   Sig:        Facility-Administered Medications: None       Past Medical History:   Diagnosis Date    Depression 2016    18 wk loss of gestation, no rx no SI/HI    Obesity affecting pregnancy, antepartum 3/29/2019    Postpartum depression     18 wk loss of gestation, no rx no SI/HI    Scoliosis of thoracolumbar spine 2021    Varicella     vac       Past Surgical History:   Procedure Laterality Date     SECTION      IN  DELIVERY ONLY N/A 2019    Procedure:  SECTION () REPEAT;  Surgeon: Vielka Munoz MD;  Location: [facility];  Service: Obstetrics    TUBAL LIGATION Bilateral 2019    Procedure: LIGATION/COAGULATION TUBAL;  Surgeon: Vielka Munoz MD;  Location: [facility];  Service: Obstetrics       Family History   Problem Relation Age of Onset    Diabetes Father         type 2    Diabetes Brother         type 2    Hypertension Maternal Uncle      I have reviewed and agree with the history as documented  E-Cigarette/Vaping    E-Cigarette Use Current Every Day User      E-Cigarette/Vaping Substances    Nicotine Yes     THC No     CBD No     Flavoring Yes     Other No     Unknown No      Social History     Tobacco Use    Smoking status: Light Tobacco Smoker     Packs/day: 0 25     Types: Cigarettes    Smokeless tobacco: Never Used    Tobacco comment: quit with preg   Vaping Use    Vaping Use: Every day    Substances: Nicotine, Flavoring   Substance Use Topics    Alcohol use: Not Currently     Comment: Social    Drug use: No        Review of Systems   Constitutional: Negative for chills and fever  HENT: Positive for ear pain and tinnitus  Negative for congestion, dental problem, ear discharge, hearing loss, sinus pressure, sinus pain and sore throat  Eyes: Negative for pain and visual disturbance  Respiratory: Negative for cough and shortness of breath  Cardiovascular: Negative for chest pain and palpitations  Gastrointestinal: Negative for abdominal pain, diarrhea, nausea and vomiting  Genitourinary: Negative for difficulty urinating, dysuria, flank pain, frequency, hematuria and urgency  Musculoskeletal: Negative for arthralgias, back pain, myalgias and neck pain  Skin: Negative for color change and rash  Neurological: Negative for dizziness, seizures, syncope, weakness, light-headedness and headaches  All other systems reviewed and are negative  Physical Exam  ED Triage Vitals [06/24/22 1324]   Temperature Pulse Respirations Blood Pressure SpO2   98 6 °F (37 °C) 96 18 120/92 100 %      Temp Source Heart Rate Source Patient Position - Orthostatic VS BP Location FiO2 (%)   Oral Monitor Sitting -- --      Pain Score       10 - Worst Possible Pain             Orthostatic Vital Signs  Vitals:    06/24/22 1324   BP: 120/92   Pulse: 96   Patient Position - Orthostatic VS: Sitting       Physical Exam  Vitals and nursing note reviewed  Constitutional:       General: She is not in acute distress  Appearance: Normal appearance  She is well-developed  She is not ill-appearing or toxic-appearing  HENT:      Head: Normocephalic and atraumatic  Ears:      Comments: Bilateral cerumen impaction, unable to visualize TMs bilaterally  There is some swelling noted in the ear canal on the right  There is no pain on pushing on the tragus, no pain on pulling of the pinna  There is no tenderness of mastoid on the right, and no cellulitic changes to the mastoid area  Nose: Nose normal       Mouth/Throat:      Mouth: Mucous membranes are moist       Pharynx: Oropharynx is clear  Eyes:      General: No scleral icterus  Extraocular Movements: Extraocular movements intact        Conjunctiva/sclera: Conjunctivae normal    Cardiovascular:      Rate and Rhythm: Normal rate and regular rhythm  Pulses: Normal pulses  Heart sounds: Normal heart sounds  No murmur heard  Pulmonary:      Effort: Pulmonary effort is normal  No respiratory distress  Breath sounds: Normal breath sounds  No wheezing, rhonchi or rales  Abdominal:      Palpations: Abdomen is soft  Musculoskeletal:         General: Normal range of motion  Cervical back: Neck supple  No tenderness  Lymphadenopathy:      Cervical: No cervical adenopathy  Skin:     General: Skin is warm and dry  Capillary Refill: Capillary refill takes less than 2 seconds  Findings: No erythema  Neurological:      General: No focal deficit present  Mental Status: She is alert and oriented to person, place, and time  Psychiatric:         Mood and Affect: Mood normal          ED Medications  Medications   carbamide peroxide (DEBROX) 6 5 % otic solution 5 drop (5 drops Both Ears Given 6/24/22 1416)   neomycin-polymyxin-hydrocortisone (CORTISPORIN) 0 35%-10,000 units/mL-1% otic suspension 4 drop (4 drops Right Ear Given 6/24/22 1417)       Diagnostic Studies  Results Reviewed     None                 No orders to display         Procedures  Ear cerumen removal    Date/Time: 6/24/2022 2:39 PM  Performed by: Neelima Bagley DO  Authorized by: Neelima Bagley DO   Universal Protocol:  Consent: Verbal consent obtained  Consent given by: patient  Patient understanding: patient states understanding of the procedure being performed  Patient consent: the patient's understanding of the procedure matches consent given  Patient identity confirmed: verbally with patient      Patient location:  ED  Procedure details:     Local anesthetic:  None    Location:  R ear    Procedure type: irrigation only      Approach:  External    Visualization (free text):  Cerumen impaction  Post-procedure details:     Complication:  None    Hearing quality:  Normal    Patient tolerance of procedure:   Tolerated well, no immediate complications  Comments: Only able to remove small pieces of cerumen; unable to visualize TM still after procedure  Patient did not want further cerumen removal           ED Course         patient has bilateral cerumen impaction  There is potential evidence of infection on the right, but unable to visualize the TM  There is some mild swelling of the ear canal   I presume is this is otitis externa based on history and appears in the canal   I attempted to clear the cerumen with irrigation sterile water and peroxide, but was mostly unsuccessful  Only small parts of cerumen removed from the ear canal   Patient was given Debrox to try the cerumen and instructed on using these at home  She was also provided with Cortisporin drops given to treat presumed infection  Patient was given ENT referral and instructed to follow-up with them in 2-3 days  Instructed to use Motrin Tylenol at home for pain control  Patient agrees with the plan  Ambulatory at discharge with steady gait  SBIRT 22yo+    Flowsheet Row Most Recent Value   SBIRT (23 yo +)    In order to provide better care to our patients, we are screening all of our patients for alcohol and drug use  Would it be okay to ask you these screening questions? Yes Filed at: 06/24/2022 1355   Initial Alcohol Screen: US AUDIT-C     1  How often do you have a drink containing alcohol? 0 Filed at: 06/24/2022 1355   2  How many drinks containing alcohol do you have on a typical day you are drinking? 0 Filed at: 06/24/2022 1355   3a  Male UNDER 65: How often do you have five or more drinks on one occasion? 0 Filed at: 06/24/2022 1355   3b  FEMALE Any Age, or MALE 65+: How often do you have 4 or more drinks on one occassion? 0 Filed at: 06/24/2022 1355   Audit-C Score 0 Filed at: 06/24/2022 1355   RONALD: How many times in the past year have you    Used an illegal drug or used a prescription medication for non-medical reasons?  Never Filed at: 06/24/2022 1355                MDM    Disposition  Final diagnoses:   Right otitis externa   Bilateral impacted cerumen     Time reflects when diagnosis was documented in both MDM as applicable and the Disposition within this note     Time User Action Codes Description Comment    6/24/2022  2:07 PM Tonny Parker Add [H60 91] Right otitis externa     6/24/2022  2:22 PM Tonny Parker Add [G73 83] Bilateral impacted cerumen       ED Disposition     ED Disposition   Discharge    Condition   Stable    Date/Time   Fri Jun 24, 2022  2:07 PM    Comment   Delvis Montes discharge to home/self care  Follow-up Information     Follow up With Specialties Details Why Neila Landau, MD Otolaryngology, Plastic Surgery Schedule an appointment as soon as possible for a visit   Theodora Hutchins 3  [phone]            Patient's Medications   Discharge Prescriptions    NEOMYCIN-POLYMYXIN-HYDROCORTISONE (CORTISPORIN) 0 35%-10,000 UNITS/ML-1% OTIC SUSPENSION    Administer 4 drops to the right ear 3 (three) times a day       Start Date: 6/24/2022 End Date: --       Order Dose: 4 drops       Quantity: 10 mL    Refills: 0     No discharge procedures on file  PDMP Review     None           ED Provider  Attending physically available and evaluated Delvis Montes  [facility] managed the patient along with the ED Attending      Electronically Signed by         Chaya White DO  06/24/22 1055

## 2022-06-24 NOTE — DISCHARGE INSTRUCTIONS
Use Debrox to dry out ear wax  Use antibiotic drops to assist with infection  Please follow up with ENT in 3-5 days  Use Motrin and Tylenol for pain control  Return to the ED with any new/concerning issues

## 2022-06-25 NOTE — ED ATTENDING ATTESTATION
6/24/2022  IAlbert MD, saw and evaluated the patient  I have discussed the patient with the resident/non-physician practitioner and agree with the resident's/non-physician practitioner's findings, Plan of Care, and MDM as documented in the resident's/non-physician practitioner's note, except where noted  All available labs and Radiology studies were reviewed  I was present for key portions of any procedure(s) performed by the resident/non-physician practitioner and I was immediately available to provide assistance  At this point I agree with the current assessment done in the Emergency Department    I have conducted an independent evaluation of this patient a history and physical is as follows:  Patient is here it earache   she has no fever she has no drainage from the ear she states it started after she was in a pool however she has no headache no other complaints on exam she has impacted cerumen on both ears canals however right is greater than left there is no mastoid tenderness the tympanic membrane is not able to be visualized there is no mastoid tenderness there is no swelling or redness of the particle there is no drainage from the ear canal  Both the resident and myself tried to irrigate the cerumen impaction over unsuccessful  Patient will be sent home with Debrox and Cortisporin  She will be referred to ENT  ED Course         Critical Care Time  Procedures

## 2022-09-07 ENCOUNTER — VBI (OUTPATIENT)
Dept: ADMINISTRATIVE | Facility: OTHER | Age: 37
End: 2022-09-07

## 2023-04-28 NOTE — PROGRESS NOTES
Assessment:  1  Myofascial pain    2  Mid back pain        Plan:  1  I will discontinue tizanidine secondary to side effects and trial methocarbamol 500 mg every 8 hours as needed myofascial pain  I advised the patient that they should not drive or operate machinery while on this medication until they see how it affects them, as it could cause lethargy and mental cloudyness  I advised the patient to call our office if they experience any side effects or issues with the medication changes  The patient verbalized an understanding  2   Discussed trigger point injections  Patient is not interested in scheduling these at this time  3  Patient declines aqua therapy at this time  4  Continue with home exercise program  5  Follow-up in 3 months or sooner if needed    History of Present Illness: The patient is a 40 y o  female last seen on 04/01/2022 who presents for a follow up office visit in regards to chronic thoracic back pain, right greater than left that is nonradiating into the thorax, abdomen or chest wall  Patient has been using tizanidine however states it is causing dizziness  She has not found relief in the past with cyclobenzaprine or physical therapy  MRI of the thoracic and lumbar spine are largely unremarkable  She does have dextroscoliosis with a [place] angle of 38°  The patient rates her pain a 7 out of 10 on the numeric pain rating scale  She intermittently has pain in the evening which is described as pressure-like  I have personally reviewed and/or updated the patient's past medical history, past surgical history, family history, social history, current medications, allergies, and vital signs today  Review of Systems:    Review of Systems   Respiratory: Negative for shortness of breath  Cardiovascular: Negative for chest pain  Gastrointestinal: Negative for constipation, diarrhea, nausea and vomiting     Musculoskeletal: Negative for arthralgias, gait problem, joint swelling and myalgias  Skin: Negative for rash  Neurological: Negative for dizziness, seizures and weakness  All other systems reviewed and are negative          Past Medical History:   Diagnosis Date    Depression 2016    18 wk loss of gestation, no rx no SI/HI    Obesity affecting pregnancy, antepartum 3/29/2019    Postpartum depression     18 wk loss of gestation, no rx no SI/HI    Scoliosis of thoracolumbar spine 2021    Varicella     vac       Past Surgical History:   Procedure Laterality Date     SECTION      CT  DELIVERY ONLY N/A 2019    Procedure:  SECTION () REPEAT;  Surgeon: Johnathan Lozano MD;  Location: [facility] ;  Service: Obstetrics    TUBAL LIGATION Bilateral 2019    Procedure: LIGATION/COAGULATION TUBAL;  Surgeon: Johnathan Lozano MD;  Location: [facility];  Service: Obstetrics       Family History   Problem Relation Age of Onset    Diabetes Father         type 2    Diabetes Brother         type 2    Hypertension Maternal Uncle        Social History     Occupational History    Not on file   Tobacco Use    Smoking status: Light Smoker     Packs/day: 0 25     Types: Cigarettes    Smokeless tobacco: Never    Tobacco comments:     quit with preg   Vaping Use    Vaping Use: Every day    Substances: Nicotine, Flavoring   Substance and Sexual Activity    Alcohol use: Not Currently     Comment: Social    Drug use: No    Sexual activity: Yes     Partners: Male     Birth control/protection: None         Current Outpatient Medications:     acetaminophen (TYLENOL) 500 mg tablet, Take 2 tablets (1,000 mg total) by mouth every 6 (six) hours as needed for mild pain or headaches, Disp: 30 tablet, Rfl: 0    ibuprofen (MOTRIN) 600 mg tablet, Take 1 tablet (600 mg total) by mouth every 6 (six) hours as needed for moderate pain, Disp: 30 tablet, Rfl: 0    methocarbamol (ROBAXIN) 500 mg tablet, Take 1 tablet (500 mg total) by mouth every 8 (eight) hours as needed "for muscle spasms, Disp: 90 tablet, Rfl: 2    topiramate (TOPAMAX) 25 mg tablet,  , Disp: , Rfl:     acetaminophen (TYLENOL) 325 mg tablet, Take 2 tablets (650 mg total) by mouth every 6 (six) hours as needed for mild pain, headaches or fever (Patient not taking: Reported on 2/18/2022), Disp: 30 tablet, Rfl: 0    docusate sodium (COLACE) 100 mg capsule, Take 1 capsule (100 mg total) by mouth 2 (two) times a day (Patient not taking: Reported on 2/18/2022), Disp: 10 capsule, Rfl: 0    methylPREDNISolone 4 MG tablet therapy pack, Use as directed on package (Patient not taking: Reported on 2/18/2022), Disp: 21 tablet, Rfl: 0    neomycin-polymyxin-hydrocortisone (CORTISPORIN) 0 35%-10,000 units/mL-1% otic suspension, Administer 4 drops to the right ear 3 (three) times a day, Disp: 10 mL, Rfl: 0    Prenatal Vit-Fe Fumarate-FA (PRENATAL VITAMIN) 27-0 8 MG TABS, Take 1 tablet by mouth daily (Patient not taking: Reported on 2/18/2022), Disp: 90 tablet, Rfl: 3    No Known Allergies    Physical Exam:    /73   Pulse 69   Ht 5' 6\" (1 676 m)   Wt 82 6 kg (182 lb)   BMI 29 38 kg/m²     Constitutional:normal, well developed, well nourished, alert, in no distress and non-toxic and no overt pain behavior  Eyes:anicteric  HEENT:grossly intact  Neck:supple, symmetric, trachea midline and no masses   Pulmonary:even and unlabored  Cardiovascular:No edema or pitting edema present  Skin:Normal without rashes or lesions and well hydrated  Psychiatric:Mood and affect appropriate  Neurologic:Cranial Nerves II-XII grossly intact  Musculoskeletal:normal gait  Right thoracic paraspinal musculature tender to palpation      Imaging  No orders to display         No orders of the defined types were placed in this encounter      "

## 2023-05-01 ENCOUNTER — OFFICE VISIT (OUTPATIENT)
Dept: PAIN MEDICINE | Facility: CLINIC | Age: 38
End: 2023-05-01

## 2023-05-01 VITALS
HEART RATE: 69 BPM | SYSTOLIC BLOOD PRESSURE: 122 MMHG | HEIGHT: 66 IN | BODY MASS INDEX: 29.25 KG/M2 | WEIGHT: 182 LBS | DIASTOLIC BLOOD PRESSURE: 73 MMHG

## 2023-05-01 DIAGNOSIS — M54.9 MID BACK PAIN: ICD-10-CM

## 2023-05-01 DIAGNOSIS — M79.18 MYOFASCIAL PAIN: Primary | ICD-10-CM

## 2023-05-01 RX ORDER — METHOCARBAMOL 500 MG/1
500 TABLET, FILM COATED ORAL EVERY 8 HOURS PRN
Qty: 90 TABLET | Refills: 2 | Status: SHIPPED | OUTPATIENT
Start: 2023-05-01

## 2023-05-01 NOTE — PATIENT INSTRUCTIONS
Metocarbamol (Robaxin, Robaxin-750) - (Por vía oral)   ECHO is off  Para qué se utiliza anton medicamento:   Sirve para tratar el dolor y los espasmos musculares  Comuníquese de inmediato con un médico o enfermera si usted tiene:  Ritmo cardíaco lento, convulsiones  Orina oscura o heces pálidas, náuseas, vómitos, falta de apetito, dolor estomacal, coloración amarillenta en la piel u ojos  Sangrado, moretones o debilidad inusuales  Visión borrosa, enrojecimiento, dolor o hinchazón de los ojos, fiebre  Desvanecimientos, CAMELIA watts     Efectos secundarios comunes:  Confusión, dificultad para dormir, dolor de tiny  Enrojecimiento (rubor) o calor en el adebayo, fabiola, brazos o parte superior del [address] 2022 Information is for [employer] use only and may not be sold, redistributed or otherwise used for commercial purposes  Inyección en los puntos desencadenantes   CUIDADO AMBULATORIO:   Kurt inyección en los puntos desencadenantes se [place] para relajar un nudo muscular  [place] ayuda a aliviar el dolor  Es posible que se pueda tratar más de un punto desencadenante gustavo kurt sesión  Cómo prepararse para kurt inyección en los puntos desencadenantes:  Carl médico le indicará cómo prepararse  Pídale a alguna persona que lo lleve a carl casa después de la inyección  Informe a carl médico [employer] isabel, incluidos los analgésicos, los anticoagulantes y los relajantes musculares  El médico le dirá si necesita dejar de usar alguno de estos medicamentos para la inyección y cuándo debe hacerlo  Le dirá qué medicamentos puede genna o no el día de la inyección  Informe a carl médico acerca de todas justus alergias, incluso a los analgésicos  Qué sucederá gustavo la inyección en los puntos desencadenantes:  Puede estar sentado o acostado, dependiendo de dónde se encuentre el punto desencadenante  Carl médico palpará el músculo para detectar el nudo   Puede que le [employer] piel sobre el nudo  Estrada médico coloca kurt aguja a través de estrada piel y en el punto desencadenante  Los calmantes con salina (solución de sal) u otros medicamentos podrían ser puestos a través de la aguja en el punto desencadenante  Estrada médico también podría usar solo kurt aguja seca (sin medicamento)  Tirará de la aguja irene [employer] final y [employer] empujará de [place]  Lo repetirá varias veces hasta que el músculo deje de moverse o se sienta relajado  El médico retirará la aguja y estirará el área del músculo  Puede aplicar presión en el área gustavo 2 minutos  Se colocará un vendaje sobre el lugar de la inyección para prevenir el sangrado o kurt infección  Qué esperar después de kurt inyección en los puntos desencadenantes:  Es posible que sienta alivio del dolor de inmediato  Es normal que un poco de dolor comience de nuevo 2 horas después  Kurt bolsa de hielo o un analgésico de venta arpan pueden ayudar a disminuir el dolor  Es posible que sienta dolor en el lugar de la inyección gustavo unos días  Si necesita otra inyección en la misma área, espere hasta que el área no esté dolorida  Estrada médico puede darle instrucciones específicas de actividades para que las siga en casa o recomendarle terapia física  En general, debería tratar de Verlan Kyle  Evite la actividad extenuante gustavo los primeros 3 o 4 días después de la inyección  No se ponga más inyecciones si todavía tiene dolor en el punto desencadenante después de 2 o 3 inyecciones  R[employer] puntos desencadenantes: Usted podría Rebbeca Dubin reacción alérgica severa al analgésico inyectado  La inyección puede ser dolorosa o puede que le duela el lugar donde se aplicó  Puede sangrar, tener moretones o desarrollar kurt infección en el área de la inyección  La inyección podría causarle un desvanecimiento   En raras ocasiones, la aguja puede causar daños en los músculos o vasos sanguíneos, o el pulmón puede colapsarse si se aplica la inyección cerca del pecho  Llame al número local de emergencias (911 en los [place]), o pídale a alguien que llame si:  Estrada boca y garganta están inflamadas  Usted tiene sibilancias o dificultad para respirar  Usted tiene dolor de pecho o estrada corazón late más rápido de lo normal     Usted siente que se va a desmayar  ¿Cuándo shreya buscar atención inmediata? Estrada adebayo está kong o inflamado  Usted tiene urticaria que se propaga por todo estrada cuerpo  Se siente débil o mareado  Llame a estrada médico o especialista en dolor si:  Usted tiene fiebre dentro de 1 semana después de la inyección  Tiene enrojecimiento o hinchazón dentro de 1 semana después de la inyección  Tiene un dolor nuevo o que empeora alrededor de sitio de la inyección  Usted tiene preguntas o inquietudes acerca de estrada condición o cuidado  Cuidados personales:  Manténgase activo después de recibir inyecciones en los puntos desencadenantes  Mueva justus articulaciones suavemente a través de estrada rango completo de movimiento gustavo la primera semana  Evite la actividad vigorosa gustavo 3 o 4 días  Kylee estiramientos regulares del músculo con puntos desencadenantes  Coloque kurt suave presión [employer] punto desencadenante, y luego estire el músculo  Pídale más información a estrada médico acerca de cómo estirar y aplicar presión  Aplique hielo al área de la inyección  Use kurt compresa de hielo o ponga hielo en kurt bolsa plástica  [place] la bolsa con kurt toalla antes de aplicarlo  Aplique hielo gustavo 15 a 20 minutos por hora o según indicaciones  Aplique calor en los puntos desencadenantes  El calor ayuda a relajar los músculos y amber el dolor en el punto desencadenante  Use kurt compresa caliente o kurt almohadilla eléctrica de baja temperatura  Aplique calor gustavo 15 minutos cada hora o lavinia se lo indicaron      Acuda a justus consultas de control con estrada médico o especialista en el dolor según le indicaron: Anote justus preguntas para que se acuerde de hacerlas gustavo justus visitas  © Copyright [employer] 2022 Information is for End User's use only and may not be sold, redistributed or otherwise used for commercial purposes  Esta información es sólo para uso en educación  Estrada intención no es darle un consejo médico sobre enfermedades o tratamientos  Colsulte con estrada Grisel Muss farmacéutico antes de seguir cualquier régimen médico para saber si es seguro y efectivo para usted

## 2024-10-10 NOTE — PROGRESS NOTES
Assessment:  1. Neck pain    2. Myofascial pain syndrome    3. S/P repeat low transverse     4. Myofascial pain        Plan:  Physical therapy referral provided to the patient today to address cervical complaints  I will refill ibuprofen 600 mg which she can take 3 times daily as needed pain.  She was advised to avoid other NSAIDs while this medication and to take the medication with food to avoid GI upset  I will refill methocarbamol 500 mg every 8 hours as needed myofascial pain.  I advised the patient that they should not drive or operate machinery while on this medication until they see how it affects them, as it could cause lethargy and mental cloudyness. I advised the patient to call our office if they experience any side effects or issues with the medication changes. The patient verbalized an understanding.  Patient may trial topical heat application to the affected area 20 minutes/h to avoid thermal injury to the skin  Follow-up in 8 weeks or sooner if needed    History of Present Illness:    The patient is a 39 y.o. female last seen on 2023  who presents for a follow up office visit in regards to a 2-day history of neck pain that radiates into the left scapular region with cervical rotation.  She denies any injury or inciting event.  She denies any radicular symptoms into the upper extremities, bowel or bladder incontinence or balance issues.  Patient has not tried any recent formal physical therapy or chiropractic therapy.  She has tried over-the-counter Tylenol and Advil without much relief.  She does have old prescriptions for ibuprofen 600 mg and methocarbamol 500 mg as needed but she has not tried them recently for this condition    The patient rates her pain a 7 out of 10 on the numeric pain rating scale.  Pain is constant in the evening and at night and is described as cramping and pressure-like    MRI of the cervical spine from 2022 shows mild central narrowing at C5-6  otherwise unremarkable.  Is no compressive pathology.  There is no significant stenosis    I have personally reviewed and/or updated the patient's past medical history, past surgical history, family history, social history, current medications, allergies, and vital signs today.       Review of Systems:    Review of Systems      Past Medical History:   Diagnosis Date    Depression 2016    18 wk loss of gestation, no rx no SI/HI    Obesity affecting pregnancy, antepartum 3/29/2019    Postpartum depression     18 wk loss of gestation, no rx no SI/HI    Scoliosis of thoracolumbar spine 2021    Varicella     vac       Past Surgical History:   Procedure Laterality Date     SECTION      [place]  DELIVERY ONLY N/A 2019    Procedure:  SECTION () REPEAT;  Surgeon: Abdiaziz Auguste MD;  Location: [facility];  Service: Obstetrics    TUBAL LIGATION Bilateral 2019    Procedure: LIGATION/COAGULATION TUBAL;  Surgeon: Abdiaziz Auguste MD;  Location: [facility];  Service: Obstetrics       Family History   Problem Relation Age of Onset    Diabetes Father         type 2    Diabetes Brother         type 2    Hypertension Maternal Uncle        Social History     Occupational History    Not on file   Tobacco Use    Smoking status: Light Smoker     Current packs/day: 0.25     Types: Cigarettes    Smokeless tobacco: Never    Tobacco comments:     quit with preg   Vaping Use    Vaping status: Every Day    Substances: Nicotine, Flavoring   Substance and Sexual Activity    Alcohol use: Not Currently     Comment: Social    Drug use: No    Sexual activity: Yes     Partners: Male     Birth control/protection: None         Current Outpatient Medications:     acetaminophen (TYLENOL) 325 mg tablet, Take 2 tablets (650 mg total) by mouth every 6 (six) hours as needed for mild pain, headaches or fever, Disp: 30 tablet, Rfl: 0    acetaminophen (TYLENOL) 500 mg tablet, Take 2 tablets (1,000 mg total) by mouth every 6  "(six) hours as needed for mild pain or headaches, Disp: 30 tablet, Rfl: 0    ibuprofen (MOTRIN) 600 mg tablet, Take 1 tablet (600 mg total) by mouth every 8 (eight) hours as needed for moderate pain, Disp: 90 tablet, Rfl: 1    methocarbamol (ROBAXIN) 500 mg tablet, Take 1 tablet (500 mg total) by mouth every 8 (eight) hours as needed for muscle spasms, Disp: 90 tablet, Rfl: 2    docusate sodium (COLACE) 100 mg capsule, Take 1 capsule (100 mg total) by mouth 2 (two) times a day (Patient not taking: Reported on 2/18/2022), Disp: 10 capsule, Rfl: 0    methylPREDNISolone 4 MG tablet therapy pack, Use as directed on package (Patient not taking: Reported on 2/18/2022), Disp: 21 tablet, Rfl: 0    neomycin-polymyxin-hydrocortisone (CORTISPORIN) 0.35%-10,000 units/mL-1% otic suspension, Administer 4 drops to the right ear 3 (three) times a day, Disp: 10 mL, Rfl: 0    Prenatal Vit-Fe Fumarate-FA (PRENATAL VITAMIN) 27-0.8 MG TABS, Take 1 tablet by mouth daily (Patient not taking: Reported on 2/18/2022), Disp: 90 tablet, Rfl: 3    topiramate (TOPAMAX) 25 mg tablet,  , Disp: , Rfl:     No Known Allergies    Physical Exam:    /77   Pulse 61   Ht 5' 6\" (1.676 m)   Wt 82.6 kg (182 lb)   BMI 29.38 kg/m²     Constitutional:normal, well developed, well nourished, alert, in no distress and non-toxic and no overt pain behavior.  Eyes:anicteric  HEENT:grossly intact  Neck:supple, symmetric, trachea midline and no masses   Pulmonary:even and unlabored  Cardiovascular:No edema or pitting edema present  Skin:Normal without rashes or lesions and well hydrated  Psychiatric:Mood and affect appropriate  Neurologic:Cranial Nerves II-XII grossly intact  Musculoskeletal:normal gait.  Full range of motion in all planes of the cervical spine.  Left cervical paraspinal and trapezius musculature mildly tender to palpation.  Bilateral upper extremity strength 5 out of 5 in all muscle groups.  Sensation intact to light touch in C5-T1 " dermatomes bilaterally.  Negative Spurling's.  Negative Christine's.        Imaging  No orders to display     MRI CERVICAL SPINE WITHOUT CONTRAST     INDICATION: M54.2: Cervicalgia.   Chronic neck pain     COMPARISON:  None.     TECHNIQUE:  Sagittal T1, sagittal T2, sagittal inversion recovery, axial T2, axial  2D merge     IMAGE QUALITY:  Diagnostic     FINDINGS:     ALIGNMENT:  There is nonspecific straightening of the cervical lordosis without subluxation.     MARROW SIGNAL:  Normal marrow signal is identified within the visualized bony structures.  No discrete marrow lesion.     CERVICAL AND VISUALIZED THORACIC CORD:  Normal signal within the visualized cord.     PREVERTEBRAL AND PARASPINAL SOFT TISSUES:  Normal.     VISUALIZED POSTERIOR FOSSA:  The visualized posterior fossa demonstrates no abnormal signal.     CERVICAL DISC SPACES:     C2-C3:  Normal.     C3-C4:  Normal.     C4-C5:  Normal.     C5-C6:  There is a small central disc herniation, protrusion type.  Mild central canal narrowing.  Neural foramina patent bilaterally.     C6-C7:  There is a diffuse disk bulge.  No significant central canal or neural foraminal narrowing.     C7-T1:  Normal.     UPPER THORACIC DISC SPACES:  Normal.     IMPRESSION:     1. There is nonspecific straightening of the cervical lordosis without subluxation.  2.  Mild spondylosis.  3.  No cord compression or cord signal abnormality.    Orders Placed This Encounter   Procedures    Ambulatory referral to Physical Therapy

## 2024-10-11 ENCOUNTER — OFFICE VISIT (OUTPATIENT)
Dept: PAIN MEDICINE | Facility: CLINIC | Age: 39
End: 2024-10-11
Payer: COMMERCIAL

## 2024-10-11 VITALS
HEIGHT: 66 IN | DIASTOLIC BLOOD PRESSURE: 77 MMHG | WEIGHT: 182 LBS | HEART RATE: 61 BPM | SYSTOLIC BLOOD PRESSURE: 112 MMHG | BODY MASS INDEX: 29.25 KG/M2

## 2024-10-11 DIAGNOSIS — M79.18 MYOFASCIAL PAIN SYNDROME: ICD-10-CM

## 2024-10-11 DIAGNOSIS — M54.2 NECK PAIN: Primary | ICD-10-CM

## 2024-10-11 DIAGNOSIS — Z98.891 S/P REPEAT LOW TRANSVERSE C-SECTION: ICD-10-CM

## 2024-10-11 DIAGNOSIS — M79.18 MYOFASCIAL PAIN: ICD-10-CM

## 2024-10-11 PROCEDURE — 99214 OFFICE O/P EST MOD 30 MIN: CPT | Performed by: NURSE PRACTITIONER

## 2024-10-11 RX ORDER — METHOCARBAMOL 500 MG/1
500 TABLET, FILM COATED ORAL EVERY 8 HOURS PRN
Qty: 90 TABLET | Refills: 2 | Status: SHIPPED | OUTPATIENT
Start: 2024-10-11

## 2024-10-11 RX ORDER — IBUPROFEN 600 MG/1
600 TABLET, FILM COATED ORAL EVERY 8 HOURS PRN
Qty: 90 TABLET | Refills: 1 | Status: SHIPPED | OUTPATIENT
Start: 2024-10-11

## 2024-10-11 NOTE — PATIENT INSTRUCTIONS
Patient Education     Ibuprofen (eye byoo PROE fen)   Nombres comerciales: EE. UU. Addaprin [OTC] [DSC]; Advil Chance Strength [OTC]; Advil Liqui-Gels minis [OTC]; Advil Migraine [OTC]; Advil [OTC]; Caldolor; Childrens Advil [OTC]; Childrens Ibuprofen [OTC] [DSC]; Childrens Motrin [OTC]; Dyspel [OTC] [DSC]; FT Ibuprofen Childrens [OTC]; FT Ibuprofen IB Childrens [OTC]; FT Ibuprofen Minis [OTC]; FT Ibuprofen [OTC]; FT Pain Relief [OTC]; GoodSense Ibuprofen Childrens [OTC]; GoodSense Ibuprofen [OTC]; I-Prin [OTC] [DSC]; IBU; IBU-200 [OTC]; Ibuprofen Childrens [OTC]; Infants Advil [OTC]; KS Ibuprofen [OTC] [DSC]; Medi-First Ibuprofen [OTC]; Motrin Childrens [OTC]; Motrin IB [OTC]; Motrin Infants Drops [OTC]; NeoProfen; Proprinal [OTC]; Provil [OTC] [DSC]   Nombres comerciales: [place] APO-Ibuprofen; Caldolor [DSC]; PMS-Ibuprofen [DSC]; TEVA-Profen   Advertencia   Tsering medicamento puede aumentar el riesgo a sufrir problemas cardíacos y en los vasos sanguíneos lavinia ataque cardíaco y accidente cerebrovascular. Estos efectos pueden ser mortales. El riesgo puede ser mayor si tiene enfermedad cardiaca o riesgo de sufrirla. Sin embargo, el riesgo también puede aumentar incluso si no presenta enfermedad cardíaca ni riesgo de enfermedad cardíaca. El riesgo puede ocurrir en el plazo de las primeras semanas de administración de tsering medicamento y puede aumentar con la administración de dosis mayores o con la administración gustavo un período prolongado. No consuma tsering medicamento antes o después de kurt cirugía cardíaca de bypass.  Tsering medicamento puede aumentar las probabilidades de presentar problemas graves y, en ocasiones, mortales en los intestinos o el estómago lavinia úlceras o hemorragias. El riesgo es mayor en personas mayores y en personas que guerrero sufrido hemorragias o úlceras en el estómago o los intestinos. Estos problemas pueden ocurrir sin signos de advertencia.  ¿Para qué se utiliza tsering medicamento?   Tsering  medicamento se utiliza para aliviar el dolor, la inflamación y la fiebre.  Tsering medicamento se utiliza para aliviar el dolor gustavo los periodos menstruales.  Tsering medicamento se utiliza para tratar la artritis.  Se le puede recetar tsering medicamento por otras razones. Consulte son estrada médico.  ¿Qué necesito decirle a mi médico ANTES de genna tsering medicamento?   Si es alérgico a tsering medicamento, a algún componente de tsering medicamento, o a otros medicamentos, alimentos o sustancias. Informe a estrada médico acerca de esta alergia y qué síntomas ha presentado.  Si es alérgico a la aspirina o a los medicamentos antiinflamatorios no esteroideos (MITA), lavinia el ibuprofeno o el naproxeno.  Si ha tenido asma ocasionada por un medicamento salicilato lavinia la aspirina o por un medicamento similar a tsering, lavinia los MITA.  Si tiene alguno de los trastornos siguientes: hemorragia gastrointestinal (GI) o problemas renales.  Si padece insuficiencia cardíaca (corazón debilitado).  Si tuvo un ataque cardiaco recientemente.  Si está tomando cualquier otro MITA (antiinflamatorio no esteroideos), un medicamento salicilato, lavinia la aspirina, o pemetrexed.  Si tiene dificultades para quedar embarazada o le están realizando controles de fertilidad.  Si está embarazada, planea quedar embarazada o queda embarazada mientras isabel tsering medicamento. Tsering medicamento puede provocarle daños al bebé mary ann si se lo isabel 20 semanas o posteriormente gustavo el embarazo. Si tiene entre 20 y 30 semanas de embarazo, solo tome tsering medicamento si estrada médico se lo ha indicado. No tome tsering medicamento si tiene más de 30 semanas de embarazo.  Esta no es kurt lista de todos los medicamentos o trastornos que pueden interactuar con tsering medicamento.  Informe a estrada médico y farmacéutico acerca de todos los medicamentos que tome (ramsey estos recetados o de venta arpan, productos naturales, vitaminas) y los trastornos que tenga. Debe verificar que sea seguro para usted  rosa anton medicamento junto con todos justus otros medicamentos y trastornos. No empiece, detenga ni modifique la dosis de ningún medicamento sin consultar antes al médico.  ¿Qué shreya saber o hacer mientras patricia anton medicamento?   Avise a todos justus proveedores de atención médica que isabel anton medicamento. [place] incluye a los médicos, enfermeras, farmacéuticos y dentistas.  Hágase análisis de demetrius si isabel anton medicamento gustavo un periodo kirt. Hable con estrada médico.  Se guerrero presentado casos de presión arterial macho con medicamentos lavinia anton. Controle estrada presión arterial según le haya indicado estrada médico.  Consulte a estrada médico antes de beber alcohol.  Si fuma, consulte al proveedor de dianelys médica.  Si tiene asma, hable con un proveedor de dianelys médica. Usted puede ser más sensible a anton medicamento.  No tome más de lo indicado. Rosa más de lo indicado puede aumentar la posibilidad de tener efectos secundarios graves.  No tome anton medicamento gustavo más tiempo del que le indicó estrada médico.  Es posible que demetrius más fácilmente. Tenga cuidado y evite las heridas. Utilice un cepillo dental suave y afeitadora eléctrica.  La posibilidad de desarrollar insuficiencia cardíaca aumenta con la administración de medicamentos lavinia anton. En personas que ya padecen insuficiencia cardíaca, aumenta la posibilidad de un ataque cardíaco, de tener que acudir al hospital debido a insuficiencia cardíaca o de fallecer. Consulte al médico.  La posibilidad de sufrir un ataque cardíaco o fallecimiento debido a problemas cardíacos aumenta en personas que kyler medicamentos lavinia anton luego de un ataque cardíaco reciente. Las personas que consumieron medicamentos lavinia anton luego de un primer ataque cardíaco también presentaron más probabilidades de morir gustavo el año posterior al incidente que aquellas personas que no tomaron medicamentos similares a anton. Consulte al médico.  Si tiene fenilcetonuria (PKU), hable con el médico. Algunos  productos contienen fenilalanina.  Tsering medicamento puede aumentar la posibilidad de un problema cerebral grave denominado meningitis aséptica. Llame a estrada médico de inmediato si tiene dolor de tiny, fiebre, escalofríos, malestar estomacal vickie o vómitos, rigidez en el fabiola, sarpullido, siente molestias en los ojos con las luces brillantes, se siente somnoliento o presenta signos de confusión.  Se guerrero presentado problemas hepáticos con medicamentos lavinia tsering. En ocasiones, esto ocasionó la muerte. Llame al médico de inmediato si presenta signos de problemas hepáticos, lavinia orina color oscuro, cansancio, disminución del apetito, malestar estomacal o dolor estomacal, heces color mami, vómitos, o piel u ojos amarillentos.  Si tiene más de 60 años, use tsering medicamento con cuidado. Puede provocarle más efectos secundarios.  Los MITA (antiinflamatorios no esteroides) lavinia tsering medicamento pueden afectar la ovulación. [place]all puede afectar la capacidad para quedar embarazada. Udall vuelve a la normalidad al suspenderlo. Consulte con el médico.  Informe a estrada médico si está amamantando a estrada bebé. Tendrá que hablar acerca de si hay riesgos para el bebé.  ¿Cuáles son los efectos secundarios por los que shreya llamar a mi médico de inmediato?   ADVERTENCIA/PRECAUCIÓN: A pesar de que es muy poco frecuente, algunas personas pueden sufrir efectos secundarios muy graves, que causen incluso la muerte, al genna un medicamento. Si presenta alguno de los siguientes signos o síntomas que puedan estar relacionados con un efecto secundario muy grave, infórmelo a estrada médico o busque asistencia médica de inmediato:  Signos de reacción alérgica tales lavinia sarpullido; urticaria; picazón; piel enrojecida, hinchada, con ampollas o descamada, con o sin fiebre; sibilancia; opresión en el pecho o la garganta; problemas para respirar, tragar o hablar; ronquera inusual; o hinchazón de la boca, el adebayo, los labios, la lengua o la garganta.  Signos  de hemorragia lavinia vomitar o toser demetrius, vómito que parece café molido, demetrius en la orina, heces negras, ire[place] o alquitranadas, sangrado de las encías, sangrado vaginal anormal, moretones sin causa o que se agrandan, o sangrado que no puede detener.  Signos de problemas renales lavinia la incapacidad de orinar, cambios en la cantidad de orina, demetrius en la orina o un aumento de peso importante.  Síntomas de niveles altos de potasio tales lavinia un latido cardíaco que no parece normal; signos de confusión; sensación de debilidad, mareos o vértigo; sensación de desmayo; entumecimiento u hormigueo; o falta de aire.  Signos de macho presión lavinia bobbi de tiny o mareos muy graves, desmayos o cambios en la visión.  Falta de aire, mucho aumento peso, inflamación en los brazos o piernas.  Dolor o presión en el pecho, o pulso acelerado.  Debilidad de 1 lado del cuerpo, dificultad para pensar o hablar, modificaciones en el equilibrio, caída de un lado de la mack o visión borrosa.  Se siente muy cansado o débil.  Zumbido en los oídos.  Dolor severo de espalda.  Cambio en la vista.  Se pueden producir reacciones cutáneas graves con anton medicamento. Estas incluyen el síndrome de Cabrera-Chas (SJS), la necrólisis epidérmica tóxica (TEN) y otras reacciones graves. A veces, los órganos del cuerpo también pueden verse afectados. Estas reacciones pueden ser mortales. Busque ayuda médica de inmediato si presenta signos lavinia piel marko, hinchada, con ampollas o descamación; ojos rojos o irritados; llagas en la boca, garganta, nariz, ojos, genitales o cualquier kunal de la piel; fiebre; escalofríos; bobbi corporales; falta de aire; o inflamación de los ganglios.  ¿Qué otros efectos secundarios tiene anton medicamento?   Todos los medicamentos pueden tener efectos secundarios. Sin embargo, muchas personas no tienen ningún efecto secundario o tienen solamente efectos secundarios menores. Llame a estrada médico o busque asistencia médica si  le molesta alguno de estos efectos secundarios o no desaparece:  Estreñimiento, diarrea, dolor o malestar estomacal, o vómitos.  Ardor de estómago.  Gases.  Mareos.  Estos no son todos los efectos secundarios que podrían ocurrir. Si tiene preguntas acerca de los efectos secundarios, llame al médico. Llame al médico para que lo aconseje acerca de los efectos secundarios.  Puede informar los efectos secundarios al organismo de dianelys de estrada país.  Puede informar sobre los efectos secundarios a la FDA al [id number]. También puede informar sobre los efectos secundarios en https://www.fda.gov/medwatch.  ¿Cómo se isabel mejor tsering medicamento?   [place] tsering medicamento según las indicaciones de estrada médico. [place] toda la información que se le brinde. Siga todas las instrucciones con atención.  Todos los productos orales:   Se puede administrar con o sin alimentos. Se debe administrar con alimentos si provoca malestar estomacal.  [place] tsering medicamento con un vaso de agua.  Cápsulas:   Se debe tragar entero. No se debe masticar, romper ni triturar.  Tabletas masticables:   Mastique completamente antes de tragar.  Líquido (suspensión):   Agite alek antes de usar.  Mida cuidadosamente las dosis del líquido. Utilice el medidor que viene con tsering medicamento. Si el medicamento no lo jed, pídale al farmacéutico un dispositivo para medir tsering medicamento.  Inyección:   Tsering medicamento se administra lavinia kurt infusión en kurt vena por un periodo de tiempo.  ¿Qué shreya hacer si no patricia kurt dosis?   Todos los productos orales:   Si isabel tsering medicamento con regularidad, tome la dosis que falta tan pronto lavinia lo recuerde.  Si ya irene es hora de la dosis siguiente, sáltese la dosis faltante y continúe con el horario habitual.  No tome dos dosis al mismo tiempo ni tome dosis adicionales.  Muchas veces tsering medicamento se isabel según sea necesario. No lo tome con kurt frecuencia mayor a la indicada por el médico.  Inyección:   Llame al médico  para obtener instrucciones.  ¿Cómo almaceno o descarto anton medicamento?   Todos los productos orales:   Almacenar a temperatura ambiente en un lugar seco. No conservar en un baño.  Proteja del calor.  Inyección:   Si necesita almacenar anton medicamento en casa, hable con estrada médico, enfermera o farmacéutico sobre cómo hacerlo.  Todas las presentaciones:   Guarde los medicamentos en un lugar seguro. Mantenga todo medicamento fuera del alcance de los niños y las mascotas.  Deseche los medicamentos sin usar o que hayan expirado. No los tire por el retrete ni los vierta al desagüe a menos que así se lo indiquen. Consulte con el farmacéutico si tiene preguntas sobre la mejor manera de desechar medicamentos. Pueden existir programas de devolución de medicamentos en estrada área.  Datos generales sobre el medicamento   Si justus síntomas o trastornos no mejoran o si empeoran, llame al médico.  No comparta estrada medicamento con otras personas ni tome el medicamento de ninguna otra persona.  Algunos medicamentos pueden tener otro folleto informativo para el paciente. Si tiene alguna pregunta sobre anton medicamento, hable con estrada médico, enfermera, farmacéutico u otro proveedor de atención médica.  Algunos medicamentos pueden tener otro folleto informativo para el paciente. Consulte al farmacéutico. Si tiene alguna pregunta sobre anton medicamento, hable con estrada médico, enfermera, farmacéutico u otro proveedor de atención médica.  Si helio que ha habido kurt sobredosis, llame al centro de toxicología local o busque atención médica de inmediato. Prepárese para responder qué se ingirió, qué cantidad y cuándo.  Uso de la información por el consumidor y exención de responsabilidad   Esta información general es un resumen limitado de la información sobre el diagnóstico, el tratamiento y/o la medicación. No pretende ser exhaustivo y debe utilizarse lavinia kurt herramienta para ayudar al usuario a comprender y/o evaluar las posibles opciones de  diagnóstico y tratamiento. NO incluye toda la información sobre las enfermedades, los tratamientos, los medicamentos, los efectos secundarios o los riesgos que pueden aplicarse a un paciente específico. No pretende ser un consejo médico ni un sustituto del consejo médico, el diagnóstico o el tratamiento de un proveedor de atención médica basado en el examen y la evaluación del proveedor de atención médica de las circunstancias específicas y únicas de un paciente. Los pacientes deben hablar con un proveedor de atención médica para obtener información completa sobre estrada dianelys, preguntas médicas y opciones de tratamiento, incluidos los riesgos o beneficios relacionados con el uso de medicamentos. Esta información no respalda ningún tratamiento o medicamento lavinia seguro, eficaz o aprobado para tratar a un paciente específico. [employer] y justus afiliados renuncian a cualquier garantía o responsabilidad relacionada con esta información o con el uso que se juice de esta. El uso de esta información se rige por las Condiciones de uso, disponibles en [email or web address]/know/clinical-effectiveness-terms.  Última fecha de revisión   2023-10-17  Derechos de autor   © 2024 [employer] y justus licenciantes y/o afiliados. Todos los derechos reservados.  Patient Education     Methocarbamol (meth oh CHRIS ba mole)   Nombres comerciales: EE. UU. Robaxin; Robaxin-750 [DSC]   Nombres comerciales: [place] Robaximol [DSC]   ¿Para qué se utiliza anton medicamento?   Anton medicamento se utiliza lavinia relajante de músculos.  ¿Qué necesito decirle a mi médico ANTES de genna anton medicamento?   Si es alérgico a anton medicamento, a algún componente de anton medicamento, o a otros medicamentos, alimentos o sustancias. Informe a estrada médico acerca de esta alergia y qué síntomas ha presentado.  Anton medicamento puede interactuar con otros medicamentos o trastornos.  Informe a estrada médico y farmacéutico acerca de todos los medicamentos que  tome (ramsey estos recetados o de venta arpan, productos naturales, vitaminas) y los trastornos que tenga. Debe verificar que sea seguro para usted genna tsering medicamento junto con todos justus otros medicamentos y trastornos. No empiece, detenga ni modifique la dosis de ningún medicamento sin consultar antes al médico.  ¿Qué shreya saber o hacer mientras patricia tsering medicamento?   Todas las presentaciones:   Avise a todos justus proveedores de atención médica que isabel tsering medicamento. [place] incluye a los médicos, enfermeras, farmacéuticos y dentistas.  Evite conducir, realizar tareas o actividades que requieran de estrada atención completa hasta que calvin cómo lo afecta tsering medicamento.  Tsering medicamento puede afectar ciertas pruebas de laboratorio. Avise a todos justus proveedores de atención médica y bioquímicos que isabel tsering medicamento.  Consulte con el médico antes de consumir alcohol, marihuana u otras formas de cannabis, o medicamentos recetados o de venta arpan que puedan ralentizar justus reacciones.  Tsering medicamento se utiliza en combinación con el descanso, fisioterapia, analgésicos, y otras terapias.  Tsering medicamento puede dañar al feto si se isabel gustavo el embarazo. Si está embarazada o queda embarazada mientras isabel tsering medicamento, llame al médico de inmediato.  Informe a estrada médico si está amamantando a estrada bebé. Tendrá que hablar acerca de si hay riesgos para el bebé.  Inyección:   Si es alérgico al látex, consulte al médico. Algunos productos tienen látex.  ¿Cuáles son los efectos secundarios por los que shreya llamar a mi médico de inmediato?   ADVERTENCIA/PRECAUCIÓN: A pesar de que es muy poco frecuente, algunas personas pueden sufrir efectos secundarios muy graves, que causen incluso la muerte, al genna un medicamento. Si presenta alguno de los siguientes signos o síntomas que puedan estar relacionados con un efecto secundario muy grave, infórmelo a estrada médico o busque asistencia médica de inmediato:  Signos de  reacción alérgica tales lavinia sarpullido; urticaria; picazón; piel enrojecida, hinchada, con ampollas o descamada, con o sin fiebre; sibilancia; opresión en el pecho o la garganta; problemas para respirar, tragar o hablar; ronquera inusual; o hinchazón de la boca, el adebayo, los labios, la lengua o la garganta.  Se siente muy cansado o débil.  Piel u ojos amarillentos.  Convulsiones.  Mareos severos o desmayos.  Ritmo cardíaco lento.  Fiebre, escalofríos o dolor de garganta.  Problemas o pérdida de la memoria.  Confusión.  Cambio en la vista.  Incapacidad para controlar los movimientos oculares.  ¿Qué otros efectos secundarios tiene tsering medicamento?   Todos los medicamentos pueden tener efectos secundarios. Sin embargo, muchas personas no tienen ningún efecto secundario o tienen solamente efectos secundarios menores. Llame a estrada médico o busque asistencia médica si le molesta alguno de estos efectos secundarios o no desaparece:  Todas las presentaciones:   Náusea o vómito.  Mareos o somnolencia.  Dolor de tiny.  Acaloramiento.  Problemas para dormir.  Nariz tapada.  Sabor a metal en la boca.  Inyección:   Irritación en la kunal en la que se aplicó la inyección.  Estos no son todos los efectos secundarios que podrían ocurrir. Si tiene preguntas acerca de los efectos secundarios, llame al médico. Llame al médico para que lo aconseje acerca de los efectos secundarios.  Puede informar los efectos secundarios al organismo de dianelys de estrada país.  Puede informar sobre los efectos secundarios a la FDA al [id number]. También puede informar sobre los efectos secundarios en https://www.fda.gov/medwatch.  ¿Cómo se isabel mejor tsering medicamento?   [place] tsering medicamento según las indicaciones de estrada médico. [place] toda la información que se le brinde. Siga todas las instrucciones con atención.  Tabletas:   Se puede administrar con o sin alimentos. Se debe administrar con alimentos si provoca malestar estomacal.  Inyección:   Tsering  medicamento se administra por inyección intramuscular o intravenosa.  ¿Qué shreya hacer si no patricia kurt dosis?   Tabletas:   Si isabel anton medicamento con regularidad, tome la dosis que falta tan pronto lavinia lo recuerde.  Si ya irene es hora de la dosis siguiente, sáltese la dosis faltante y continúe con el horario habitual.  No tome dos dosis al mismo tiempo ni tome dosis adicionales.  Muchas veces anton medicamento se isabel según sea necesario. No lo tome con kurt frecuencia mayor a la indicada por el médico.  Inyección:   Llame al médico para obtener instrucciones.  ¿Cómo almaceno o descarto anton medicamento?   Tabletas:   Almacenar a temperatura ambiente en un lugar seco. No conservar en un baño.  Inyección:   Si necesita almacenar anton medicamento en casa, hable con estrada médico, enfermera o farmacéutico sobre cómo hacerlo.  Todas las presentaciones:   Guarde los medicamentos en un lugar seguro. Mantenga todo medicamento fuera del alcance de los niños y las mascotas.  Deseche los medicamentos sin usar o que hayan expirado. No los tire por el retrete ni los vierta al desagüe a menos que así se lo indiquen. Consulte con el farmacéutico si tiene preguntas sobre la mejor manera de desechar medicamentos. Pueden existir programas de devolución de medicamentos en estrada área.  Datos generales sobre el medicamento   Si justus síntomas o trastornos no mejoran o si empeoran, llame al médico.  No comparta estrada medicamento con otras personas ni tome el medicamento de ninguna otra persona.  Algunos medicamentos pueden tener otro folleto informativo para el paciente. Si tiene alguna pregunta sobre anton medicamento, hable con estrada médico, enfermera, farmacéutico u otro proveedor de atención médica.  Algunos medicamentos pueden tener otro folleto informativo para el paciente. Consulte al farmacéutico. Si tiene alguna pregunta sobre anton medicamento, hable con estrada médico, enfermera, farmacéutico u otro proveedor de atención médica.  Si helio que ha  habido kurt sobredosis, llame al centro de toxicología local o busque atención médica de inmediato. Prepárese para responder qué se ingirió, qué cantidad y cuándo.  Uso de la información por el consumidor y exención de responsabilidad   Esta información general es un resumen limitado de la información sobre el diagnóstico, el tratamiento y/o la medicación. No pretende ser exhaustivo y debe utilizarse lavinia kurt herramienta para ayudar al usuario a comprender y/o evaluar las posibles opciones de diagnóstico y tratamiento. NO incluye toda la información sobre las enfermedades, los tratamientos, los medicamentos, los efectos secundarios o los riesgos que pueden aplicarse a un paciente específico. No pretende ser un consejo médico ni un sustituto del consejo médico, el diagnóstico o el tratamiento de un proveedor de atención médica basado en el examen y la evaluación del proveedor de atención médica de las circunstancias específicas y únicas de un paciente. Los pacientes deben hablar con un proveedor de atención médica para obtener información completa sobre estrada dianelys, preguntas médicas y opciones de tratamiento, incluidos los riesgos o beneficios relacionados con el uso de medicamentos. Esta información no respalda ningún tratamiento o medicamento lavinia seguro, eficaz o aprobado para tratar a un paciente específico. [employer] y justus afiliados renuncian a cualquier garantía o responsabilidad relacionada con esta información o con el uso que se juice de esta. El uso de esta información se rige por las Condiciones de uso, disponibles en https://www.Help/System[employer].com/en/know/clinical-effectiveness-terms.  Última fecha de revisión   2021-06-07  Derechos de autor   © 2024 [employer] y justus licenciantes y/o afiliados. Todos los derechos reservados.

## (undated) DEVICE — GAUZE SPONGES,16 PLY: Brand: CURITY

## (undated) DEVICE — GLOVE INDICATOR PI UNDERGLOVE SZ 8 BLUE

## (undated) DEVICE — GLOVE SRG BIOGEL ECLIPSE 8

## (undated) DEVICE — Device

## (undated) DEVICE — TELFA NON-ADHERENT ABSORBENT DRESSING: Brand: TELFA

## (undated) DEVICE — LARGE, DISPOSABLE ALEXIS O C-SECTION PROTECTOR - RETRACTOR: Brand: ALEXIS ® O C-SECTION PROTECTOR - RETRACTOR

## (undated) DEVICE — SKIN MARKER DUAL TIP WITH RULER CAP, FLEXIBLE RULER AND LABELS: Brand: DEVON

## (undated) DEVICE — ADHESIVE SKN CLSR HISTOACRYL FLEX 0.5ML LF

## (undated) DEVICE — PACK C-SECTION PBDS

## (undated) DEVICE — ABDOMINAL PAD: Brand: DERMACEA

## (undated) DEVICE — SUT MONOCRYL 0 CTX 36 IN Y398H

## (undated) DEVICE — SUT VICRYL 2-0 SH 27 IN UNDYED J417H

## (undated) DEVICE — CHLORAPREP HI-LITE 26ML ORANGE

## (undated) DEVICE — SUT MONOCRYL 4-0 PS-2 27 IN Y426H

## (undated) DEVICE — SUT VICRYL 0 CT-1 36 IN J946H